# Patient Record
Sex: MALE | Race: OTHER | HISPANIC OR LATINO
[De-identification: names, ages, dates, MRNs, and addresses within clinical notes are randomized per-mention and may not be internally consistent; named-entity substitution may affect disease eponyms.]

---

## 2020-08-24 ENCOUNTER — LABORATORY RESULT (OUTPATIENT)
Age: 65
End: 2020-08-24

## 2020-08-24 ENCOUNTER — APPOINTMENT (OUTPATIENT)
Dept: GASTROENTEROLOGY | Facility: CLINIC | Age: 65
End: 2020-08-24
Payer: MEDICARE

## 2020-08-24 VITALS
WEIGHT: 132 LBS | BODY MASS INDEX: 21.99 KG/M2 | HEART RATE: 83 BPM | TEMPERATURE: 97.9 F | HEIGHT: 65 IN | SYSTOLIC BLOOD PRESSURE: 123 MMHG | DIASTOLIC BLOOD PRESSURE: 75 MMHG

## 2020-08-24 DIAGNOSIS — E11.9 TYPE 2 DIABETES MELLITUS W/OUT COMPLICATIONS: ICD-10-CM

## 2020-08-24 DIAGNOSIS — R07.89 OTHER CHEST PAIN: ICD-10-CM

## 2020-08-24 DIAGNOSIS — Z83.3 FAMILY HISTORY OF DIABETES MELLITUS: ICD-10-CM

## 2020-08-24 DIAGNOSIS — Z83.79 FAMILY HISTORY OF OTHER DISEASES OF THE DIGESTIVE SYSTEM: ICD-10-CM

## 2020-08-24 PROCEDURE — 99204 OFFICE O/P NEW MOD 45 MIN: CPT

## 2020-08-24 RX ORDER — SIMVASTATIN 20 MG/1
20 TABLET, FILM COATED ORAL
Refills: 0 | Status: ACTIVE | COMMUNITY

## 2020-08-24 RX ORDER — SITAGLIPTIN AND METFORMIN HYDROCHLORIDE 50; 1000 MG/1; MG/1
50-1000 TABLET, FILM COATED ORAL
Refills: 0 | Status: ACTIVE | COMMUNITY

## 2020-08-24 RX ORDER — LOSARTAN POTASSIUM 25 MG/1
25 TABLET, FILM COATED ORAL
Refills: 0 | Status: ACTIVE | COMMUNITY

## 2020-08-24 RX ORDER — PANTOPRAZOLE 40 MG/1
40 TABLET, DELAYED RELEASE ORAL
Refills: 0 | Status: ACTIVE | COMMUNITY

## 2020-08-24 NOTE — HISTORY OF PRESENT ILLNESS
[None] : had no significant interval events [Nausea] : denies nausea [Vomiting] : denies vomiting [Constipation] : denies constipation [Diarrhea] : denies diarrhea [Rectal Pain] : denies rectal pain [Yellow Skin Or Eyes (Jaundice)] : denies jaundice [Wt Loss ___ Lbs] : recent [unfilled] ~Upound(s) weight loss [Heartburn] : heartburn [Abdominal Pain] : abdominal pain [Abdominal Swelling] : abdominal swelling [GERD] : gastroesophageal reflux disease [Peptic Ulcer Disease] : peptic ulcer disease [Cholelithiasis] : cholelithiasis [Cholecystectomy] : cholecystectomy [Wt Gain ___ Lbs] : no recent weight gain [Pancreatitis] : no pancreatitis [Kidney Stone] : no kidney stone [Hiatus Hernia] : no hiatus hernia [Diverticulitis] : no diverticulitis [Inflammatory Bowel Disease] : no inflammatory bowel disease [Irritable Bowel Syndrome] : no irritable bowel syndrome [Alcohol Abuse] : no alcohol abuse [Malignancy] : no malignancy [Abdominal Surgery] : no abdominal surgery [Appendectomy] : no appendectomy [de-identified] : The patient is a 65-year-old  male with past medical history significant for hypertension, hypercholesterolemia and diabetes mellitus who was referred to my office by Dr. Shayla Thorne for abdominal pain, dyspepsia and gastroesophageal reflux disease. The patient also admits to having atypical chest pain. I was asked to render an opinion for consultation for the above complaints.   The patient states that he is feeling uncomfortable x several months.  The patient complains of abdominal pain.  The patient describes the abdominal pain as a crampy, intermittent upper abdominal discomfort that is nonradiating in nature.  The abdominal pain is worse with meals and improves with passing gas or having a bowel movement.  The abdominal pain is described as being mild to moderate in nature.  The abdominal pain occurs at night.  The abdominal pain can occur at any time.   The abdominal pain never has awakened the patient from sleep.  The abdominal pain is not relieved with any medications.  The abdominal pain is associated with abdominal gas and bloating.  The patient denies any nausea or vomiting.  The patient complains of gastroesophageal reflux disease but denies any dysphagia. The gastroesophageal reflux disease is worse after meals and late at night and in the early morning. The gastroesophageal reflux disease is slightly improved with proton pump inhibitors, H2 blockers and antacids.  The patient complains of atypical chest pain but denies any atypical chest pain, shortness of breath or palpitations.  The patient denies any diaphoresis. The patient denies any constipation or diarrhea.  The patient has 2 to 3 bowel movements a day.  The patient denies a change in bowel habits.  The patient denies a change in caliber of stool.  The patient denies having mucus discharge with the bowel movements.  The patient denies any bright red blood per rectum, melena or hematemesis.  The patient complains of rectal discomfort and rectal pruritus. The patient complains of weight loss but denies any anorexia.  The patient admits to losing 15 pounds over the past 3 years, unintentionally. He does not attribute the weight loss to exercise or change in diet.   He denies any fevers or chills.  The patient complains of occasional pruritus but denies any jaundice.  The patient complains of occasional lower back pain.  The blood work performed by his PMD on Kathy 10, 2020 revealed an elevated Hgb A1c of 6.7% and normal liver enzymes.  The patient had a prior history of cholecystectomy secondary to biliary colic.The patient admits to having a prior upper endoscopy and colonoscopy performed by another gastroenterologist, Dr. Syed Horne.  According to the patient, the upper endoscopic findings were unknown to the patient.  The colonoscopic findings revealed mild diverticulosis.   The patient admits to a family history of GI problems.  The patient’s mother had a history of liver cirrhosis with esophageal varices at age 51. [de-identified] : The blood work performed by his PMD on Kathy 10, 2020 revealed an elevated Hgb A1c of 6.7% and normal liver enzymes.\par (-) smoking, (-) ETOH, (-) IVDA\par

## 2020-08-24 NOTE — REVIEW OF SYSTEMS
[Feeling Tired] : feeling tired [Eyesight Problems] : eyesight problems [Chest Pain] : chest pain [Loss Of Hearing] : hearing loss [Heartburn] : heartburn [Abdominal Pain] : abdominal pain [Hesitancy] : urinary hesitancy [Easy Bruising] : a tendency for easy bruising [Nocturia] : nocturia [Itching] : itching [Negative] : Heme/Lymph [FreeTextEntry9] : myalgias [FreeTextEntry4] : echo in left ear

## 2020-08-24 NOTE — ASSESSMENT
[FreeTextEntry1] : Abdominal Pain: The patient complains of abdominal pain. The patient is to avoid nonsteroidal anti-inflammatory drugs and aspirin. I recommend a trial of Pantoprazole 40 mg once a day for 3 months for the symptoms.  \par Dyspepsia: The patient complains of dyspeptic symptoms.  The patient was advised to abide by an anti-gas diet.  The patient was given a pamphlet for anti-gas.  The patient and I reviewed the anti-gas diet at length. The patient is to start on a trial of Phazyme one tablet 3 times a day p.r.n. abdominal pain and gas.\par GERD: The patient was advised to avoid late-night meals and dietary indiscretions.  The patient was advised to avoid fried and fatty foods.  The patient was advised to abide by an anti-GERD diet. The patient was given a pamphlet for anti-GERD.  The patient and I reviewed the anti-GERD diet at length. I recommend a trial of Pantoprazole 40 mg once a day x 3 months for the symptoms.\par Atypical Chest Pain: The patient complains of atypical chest pain of unclear etiology.  The patient was advised to follow up with the PMD and cardiologist regarding evaluation for the atypical chest pain. The patient was told of possible etiologies such as cardiac, pulmonary, GI, musculoskeletal, stress and other causes for the atypical chest pain.  The patient agrees and will follow-up with the PMD and cardiologist. \par Weight Loss: The patient complains of weight loss but denies any anorexia.  The patient admits to losing 15 pounds over the past 3 years, unintentionally. He does not attribute the weight loss to exercise or change in diet.\par Upper Endoscopy: I recommend an upper endoscopy  to assess the symptoms for peptic ulcer disease versus esophagitis.  The patient was told of the risks and benefits of the procedure.  The patient was told of the risks of perforation, emergency surgery, bleeding, infections and missed lesions. The patient agreed and will schedule for procedure. The patient is to be n.p.o. after midnight.  The patient is to return for the procedure. \par Prior Endoscopic Procedures: The patient had a prior upper endoscopy and colonoscopy performed by another gastroenterologist, Terrell Horne.  I will try to obtain the prior upper endoscopy and colonoscopy reports.  The patient is to sign a record release to obtain those records.\par History of Peptic Ulcer Disease: The patient has a prior history of peptic ulcer disease. The patient is to avoid nonsteroidal anti-inflammatory drugs and aspirin. The patient`s symptoms have improved while taking Pantoprazole 40 mg once a day. I recommend continuing on a trial of Pantoprazole 40 mg once a day for 3 to 6 months for the symptoms. I recommend a repeat upper endoscopy to reassess for ulcer healing.  The patient was told of the risks and benefits of the procedure.  The patient was told of the risks of perforation, emergency surgery, bleeding, infections and missed lesions. The patient agreed and will schedule for procedure.  The patient is to be n.p.o. after midnight.  The patient is to return for the procedure. \par Blood Work: I recommend blood work to assess the patient's symptoms. I recommend a CBC, SMA 24, amylase, lipase, ESR, TFTs, SANDI, rheumatoid factor, celiac sprue panel, IgA, profile for hepatitis A, B, C. ,iron, TIBC, ferritin level and lipid profile.  I also recommend obtaining the recent blood work performed by the patient's PMD.\par Imaging Study: I recommend an imaging study to assess the symptoms. I recommend a CAT scan of the abdomen and pelvis with and without IV contrast to assess the pancreas for pancreatic cystic lesions.\par Diverticulitis: The patient has symptoms suggestive of acute diverticulitis. The patient may require an emergency room evaluation for diverticulitis if the symptoms persist.  The patient may require treatment with antibiotics if the symptoms persist. The patient may require treatment with a trial of Metronidazole 500 mg 3 times a day and Ciprofloxin 500mg twice a day for 2 weeks for the presumed acute diverticulitis.     I recommend a low residue diet.  I recommend a CAT scan of the abdomen and pelvis with IV contrast to assess the acute diverticulitis and possible complications. The patient was advised to go to the hospital if fever, chills, worsening abdominal pain or GI bleeding recurs.  The patient agrees.\par Follow-up: The patient is to follow-up in the office in 4 weeks to reassess the symptoms. The patient was told to call the office if any further problems. \par \par \par

## 2020-08-25 LAB
ALBUMIN SERPL ELPH-MCNC: 5 G/DL
ALP BLD-CCNC: 57 U/L
ALT SERPL-CCNC: 23 U/L
AMYLASE/CREAT SERPL: 104 U/L
ANA SER IF-ACNC: NEGATIVE
ANION GAP SERPL CALC-SCNC: 14 MMOL/L
AST SERPL-CCNC: 23 U/L
BASOPHILS # BLD AUTO: 0.07 K/UL
BASOPHILS NFR BLD AUTO: 0.9 %
BILIRUB SERPL-MCNC: 0.4 MG/DL
BUN SERPL-MCNC: 16 MG/DL
CALCIUM SERPL-MCNC: 10.2 MG/DL
CANCER AG19-9 SERPL-ACNC: 14 U/ML
CEA SERPL-MCNC: 1.5 NG/ML
CHLORIDE SERPL-SCNC: 101 MMOL/L
CO2 SERPL-SCNC: 25 MMOL/L
CREAT SERPL-MCNC: 0.95 MG/DL
EOSINOPHIL # BLD AUTO: 0.19 K/UL
EOSINOPHIL NFR BLD AUTO: 2.5 %
ERYTHROCYTE [SEDIMENTATION RATE] IN BLOOD BY WESTERGREN METHOD: 4 MM/HR
FERRITIN SERPL-MCNC: 50 NG/ML
GGT SERPL-CCNC: 20 U/L
GLIADIN IGA SER QL: <5 UNITS
GLIADIN IGG SER QL: <5 UNITS
GLIADIN PEPTIDE IGA SER-ACNC: NEGATIVE
GLIADIN PEPTIDE IGG SER-ACNC: NEGATIVE
GLUCOSE SERPL-MCNC: 107 MG/DL
HBV CORE IGG+IGM SER QL: NONREACTIVE
HBV CORE IGM SER QL: NONREACTIVE
HBV SURFACE AB SER QL: NONREACTIVE
HBV SURFACE AG SER QL: NONREACTIVE
HCT VFR BLD CALC: 49.6 %
HCV AB SER QL: NONREACTIVE
HCV S/CO RATIO: 0.12 S/CO
HEMOCCULT STL QL: NEGATIVE
HEPATITIS A IGG ANTIBODY: REACTIVE
HGB BLD-MCNC: 15.3 G/DL
IGA SER QL IEP: 241 MG/DL
IMM GRANULOCYTES NFR BLD AUTO: 0.1 %
IRON SATN MFR SERPL: 26 %
IRON SERPL-MCNC: 89 UG/DL
LPL SERPL-CCNC: 64 U/L
LYMPHOCYTES # BLD AUTO: 2.18 K/UL
LYMPHOCYTES NFR BLD AUTO: 28.3 %
MAN DIFF?: NORMAL
MCHC RBC-ENTMCNC: 28.8 PG
MCHC RBC-ENTMCNC: 30.8 GM/DL
MCV RBC AUTO: 93.2 FL
MONOCYTES # BLD AUTO: 0.63 K/UL
MONOCYTES NFR BLD AUTO: 8.2 %
NEUTROPHILS # BLD AUTO: 4.63 K/UL
NEUTROPHILS NFR BLD AUTO: 60 %
PLATELET # BLD AUTO: 268 K/UL
POTASSIUM SERPL-SCNC: 5.2 MMOL/L
PROT SERPL-MCNC: 7.5 G/DL
RBC # BLD: 5.32 M/UL
RBC # FLD: 15.1 %
RHEUMATOID FACT SER QL: <10 IU/ML
SODIUM SERPL-SCNC: 140 MMOL/L
TIBC SERPL-MCNC: 343 UG/DL
TSH SERPL-ACNC: 1.03 UIU/ML
TTG IGA SER IA-ACNC: <1.2 U/ML
TTG IGA SER-ACNC: NEGATIVE
TTG IGG SER IA-ACNC: 1.4 U/ML
TTG IGG SER IA-ACNC: NEGATIVE
UIBC SERPL-MCNC: 255 UG/DL
WBC # FLD AUTO: 7.71 K/UL

## 2020-08-26 LAB
HBV E AB SER QL: NEGATIVE
HBV E AG SER QL: NEGATIVE

## 2020-09-14 ENCOUNTER — APPOINTMENT (OUTPATIENT)
Dept: DISASTER EMERGENCY | Facility: CLINIC | Age: 65
End: 2020-09-14

## 2020-09-15 LAB — SARS-COV-2 N GENE NPH QL NAA+PROBE: NOT DETECTED

## 2020-09-17 ENCOUNTER — TRANSCRIPTION ENCOUNTER (OUTPATIENT)
Age: 65
End: 2020-09-17

## 2020-09-17 ENCOUNTER — OUTPATIENT (OUTPATIENT)
Dept: OUTPATIENT SERVICES | Facility: HOSPITAL | Age: 65
LOS: 1 days | End: 2020-09-17
Payer: MEDICARE

## 2020-09-17 ENCOUNTER — RESULT REVIEW (OUTPATIENT)
Age: 65
End: 2020-09-17

## 2020-09-17 ENCOUNTER — APPOINTMENT (OUTPATIENT)
Dept: GASTROENTEROLOGY | Facility: HOSPITAL | Age: 65
End: 2020-09-17

## 2020-09-17 DIAGNOSIS — R10.12 LEFT UPPER QUADRANT PAIN: ICD-10-CM

## 2020-09-17 DIAGNOSIS — R10.11 RIGHT UPPER QUADRANT PAIN: ICD-10-CM

## 2020-09-17 PROCEDURE — 88305 TISSUE EXAM BY PATHOLOGIST: CPT | Mod: 26

## 2020-09-17 PROCEDURE — 88312 SPECIAL STAINS GROUP 1: CPT

## 2020-09-17 PROCEDURE — 88312 SPECIAL STAINS GROUP 1: CPT | Mod: 26

## 2020-09-17 PROCEDURE — 43239 EGD BIOPSY SINGLE/MULTIPLE: CPT

## 2020-09-17 PROCEDURE — 88305 TISSUE EXAM BY PATHOLOGIST: CPT

## 2020-09-17 PROCEDURE — 82962 GLUCOSE BLOOD TEST: CPT

## 2020-09-17 NOTE — CHART NOTE - NSCHARTNOTEFT_GEN_A_CORE
Esophagogastroduodenoscopy Report:    Indication:             Abdominal pain, dyspepsia, GERD, atypical chest pain, weight loss, Hx. of peptic ulcer disease  Referring MD:       Dr. Shayla Thorne  Instrument:  #        [id number]  Anesthesia:            MAC    Consent:  Informed consent was obtained from the patient after providing any opportunity for questions  Procedure: The gastroscope was gently passed through the incisoral orifice into the oral cavity and under direct visualization the esophagus was intubated. The endoscope was passed down the esophagus, through the stomach and into proximal jejunum. Color, texture, mucosa and anatomy of the esophagus, stomach, and duodenum were carefully examined with the scope. The patient tolerated the procedure well. After completion of the examination, the patient was transferred to the recovery room.     Procedure: Upper endoscopy and biopsies    Preparation: NPO     Findings:     Oropharynx:	   Normal appearing oropharynx.  Esophagus:	   Normal appearing esophagus with no ulcers, erosions, erythema noted.  Biopsy taken.  EG-junction:	   Normal appearing E-G junction at 35 cm. (2 cm) hiatal hernia noted. No ulcers, erosions or erythema noted.  Cardia:	                 Mild diffuse erythema suggestive of gastritis but no ulcers or erosions noted.  (+) Bile suctioned.  Body:	                 Normal appearing gastric mucosa with no ulcers, erosions or erythema noted.  Biopsy taken.  Antrum:	                 Normal appearing gastric mucosa with no ulcers, erosions or erythema noted.  Biopsy taken.  Pylorus:	                 Normal appearing pylorus and pyloric channel with no ulcers, erosions or erythema noted.     Duodenal Bulb:         Normal appearing duodenal mucosa with no ulcers, erosions or erythema noted. Biopsy taken.  2nd portion:	    Normal appearing duodenal mucosa with no ulcers, erosions or erythema noted.  Biopsy taken.  3rd portion:	    Not visualized.      EBL:0    Impression: 1. Hiatal hernia 2. Mild diffuse bile gastritis    Plan:    1. Avoid late-night meals and dietary indiscretions.  2. Avoid fried and fatty foods.  3. Avoid nonsteroidal anti-inflammatory drugs and aspirin.  4. Will check path results.  5. Recommend a trial of pantoprazole 40 mg once a day for 3 months.  6. Followup in office in 4 weeks to reassess the symptoms and discuss the findings.      Procedure Start Time:  9:36 am  Procedure End Time:    9:43 am      Attending:       Telly Mcgrath M.D.

## 2020-09-21 LAB — SURGICAL PATHOLOGY STUDY: SIGNIFICANT CHANGE UP

## 2020-11-02 ENCOUNTER — APPOINTMENT (OUTPATIENT)
Dept: GASTROENTEROLOGY | Facility: CLINIC | Age: 65
End: 2020-11-02
Payer: MEDICARE

## 2020-11-02 VITALS
WEIGHT: 135 LBS | OXYGEN SATURATION: 98 % | HEART RATE: 90 BPM | SYSTOLIC BLOOD PRESSURE: 123 MMHG | DIASTOLIC BLOOD PRESSURE: 79 MMHG | TEMPERATURE: 97.2 F | BODY MASS INDEX: 22.49 KG/M2 | HEIGHT: 65 IN

## 2020-11-02 DIAGNOSIS — R63.4 ABNORMAL WEIGHT LOSS: ICD-10-CM

## 2020-11-02 DIAGNOSIS — K57.32 DIVERTICULITIS OF LARGE INTESTINE W/OUT PERFORATION OR ABSCESS W/OUT BLEEDING: ICD-10-CM

## 2020-11-02 DIAGNOSIS — R10.11 RIGHT UPPER QUADRANT PAIN: ICD-10-CM

## 2020-11-02 DIAGNOSIS — R13.10 DYSPHAGIA, UNSPECIFIED: ICD-10-CM

## 2020-11-02 DIAGNOSIS — R10.12 RIGHT UPPER QUADRANT PAIN: ICD-10-CM

## 2020-11-02 PROCEDURE — 99072 ADDL SUPL MATRL&STAF TM PHE: CPT

## 2020-11-02 PROCEDURE — 99214 OFFICE O/P EST MOD 30 MIN: CPT

## 2020-11-02 NOTE — HISTORY OF PRESENT ILLNESS
[None] : had no significant interval events [Nausea] : denies nausea [Vomiting] : denies vomiting [Diarrhea] : denies diarrhea [Yellow Skin Or Eyes (Jaundice)] : denies jaundice [Heartburn] : heartburn [Constipation] : constipation [Abdominal Pain] : abdominal pain [Abdominal Swelling] : abdominal swelling [Rectal Pain] : rectal pain [GERD] : gastroesophageal reflux disease [Hiatus Hernia] : hiatus hernia [Cholelithiasis] : cholelithiasis [Cholecystectomy] : cholecystectomy [Wt Gain ___ Lbs] : no recent weight gain [Wt Loss ___ Lbs] : no recent weight loss [Peptic Ulcer Disease] : no peptic ulcer disease [Pancreatitis] : no pancreatitis [Kidney Stone] : no kidney stone [Inflammatory Bowel Disease] : no inflammatory bowel disease [Irritable Bowel Syndrome] : no irritable bowel syndrome [Diverticulitis] : no diverticulitis [Alcohol Abuse] : no alcohol abuse [Malignancy] : no malignancy [Abdominal Surgery] : no abdominal surgery [Appendectomy] : no appendectomy [de-identified] : The patient states that he is feeling better. The patient denies any jaundice or pruritus.  The patient complains of chronic lower back pain. The patient complains of abdominal pain.  The patient describes the abdominal pain as a crampy, intermittent diffuse abdominal discomfort that is nonradiating in nature.  The abdominal pain is worse with meals and improves with passing gas or having a bowel movement.  The abdominal pain is described as being mild to moderate in nature.  The abdominal pain occurs at night and in the morning.  The abdominal pain can occur at any time.   The abdominal pain has never awakened the patient from sleep.  The abdominal pain is slightly relieved with certain medication such as proton pump inhibitors, H2 blockers and antacids.  The abdominal pain is associated with abdominal gas and bloating.  The patient denies any nausea or vomiting.  The patient complains of occasional gastroesophageal reflux disease and dysphagia.  The dysphagia is worse with solids but unrelated to liquids. The gastroesophageal reflux disease is worse after meals and late at night and in the early morning. The gastroesophageal reflux disease is improved with proton pump inhibitors, H2 blockers and antacids.   The patient denies any atypical chest pain, shortness of breath or palpitations.  The patient denies any diaphoresis. The patient complains of constipation but denies any constipation or diarrhea.  The patient has 1 to 2 bowel movements a day.  The patient complains of a change in bowel habits.  The patient complains of a change in caliber of stool.   .  The patient denies having mucus discharge with the bowel movements.  The patient denies any bright red blood per rectum, melena or hematemesis.  The patient complains of rectal pain and rectal pruritus.  The patient denies any weight loss or anorexia.  He denies any fevers or chills.  The patient had an upper endoscopy at the [facility] endoscopy suite on September 17, 2020. The upper endoscopy was performed up to the level of the second portion of the duodenum. The upper endoscopy revealed a hiatal hernia and mild diffuse bile gastritis. Biopsies were taken of the distal esophagus, antrum, body of stomach and duodenum to assess for esophagitis, gastritis and duodenitis. The pathology revealed distal esophagus with squamous mucosa with reactive changes that was negative for metaplasia or eosinophilia with no evidence of fungal infection, gastric antral and body mucosa with benign antral and oxyntic mucosa with patchy, nonspecific inflammation with mild foveolar hyperplasia, mild mucosal fibrosis and fundic gland polyps that was negative for Helicobacter pylori and benign small intestinal mucosa with intact villous architecture with no histologic evidence of celiac disease.  The patient tolerated the procedure well.  The blood work performed on August 24, 2020 revealed an elevated glucose of 107 mg/dl, an elevated lipase of 64 U/L, a reactive hepatitis A IgG antibody, a normal CEA level of 1.5 ng/ml and normal CA 19-9 of 14 U/ml.  The blood work performed by his PMD on Kathy 10, 2020 revealed an elevated Hgb A1c of 6.7% and normal liver enzymes. The patient had a prior history of cholecystectomy secondary to biliary colic.  The patient admits to having a prior upper endoscopy and colonoscopy performed by another gastroenterologist, Dr. Syed Horne. According to the patient, the upper endoscopic findings were unknown to the patient. The colonoscopic findings revealed mild diverticulosis. The patient admits to a family history of GI problems. The patient’s mother had a history of liver cirrhosis with esophageal varices at age 51.  [de-identified] : (-) smoking, (-) ETOH, (-) IVDA\par

## 2020-11-02 NOTE — ASSESSMENT
[FreeTextEntry1] : Abdominal Pain: The patient complains of abdominal pain. The patient is to avoid nonsteroidal anti-inflammatory drugs and aspirin. I recommend a trial of Pantoprazole 40 mg once a day for 3 months for the symptoms.  \par Dyspepsia: The patient complains of dyspeptic symptoms.  The patient was advised to abide by an anti-gas (low FOD MAP) diet.  The patient was given a pamphlet for anti-gas.  The patient and I reviewed the anti-gas diet at length. The patient is to start on a trial of Phazyme one tablet 3 times a day p.r.n. abdominal pain and gas.\par GERD: The patient was advised to avoid late-night meals and dietary indiscretions.  The patient was advised to avoid fried and fatty foods.  The patient was advised to abide by an anti-GERD diet. The patient was given a pamphlet for anti-GERD.  The patient and I reviewed the anti-GERD diet at length. I recommend a trial of Pantoprazole 40 mg once a day x 3 months for the symptoms.\par Dysphagia: The patient complains of dysphagia of unclear etiology.   The dysphagia is worse with meals but not with liquids.   If the symptoms persist despite medications and since the upper endoscopy is unremarkable, the patient may require motility studies to assess the etiology of the dysphagia.  The patient agrees and will follow-up for further work-up and treatment.\par Constipation: The patient complains of constipation. I recommend a high-fiber diet. I recommend a trial of a probiotic such as Align once a day. I recommend a trial of Metamucil once a day for fiber supplementation. I recommend a trial of Miralax 1 packet once a day for the constipation. If the symptoms persist, the patient may require a colonoscopy to assess the symptoms.  The patient was told of the risks and benefits of the procedure.  The patient was told of the risks of perforation, emergency surgery, bleeding, infections and missed lesions.  The patient agreed and will followup to reassess the symptoms.  \par Weight Loss: The patient complained of weight loss but denies any anorexia. The patient admitted to losing 15 pounds over 3 years, unintentionally. He does not attribute the weight loss to exercise or change in diet.\par Imaging Study: I recommend an imaging study to assess the symptoms. I recommend a CAT scan of the abdomen and pelvis with and without IV contrast to assess the pancreas for pancreatic cystic lesions.\par Diverticulitis: The patient has symptoms suggestive of acute diverticulitis. The patient may require an emergency room evaluation for diverticulitis if the symptoms persist. The patient may require treatment with antibiotics if the symptoms persist. The patient may require treatment with a trial of Metronidazole 500 mg 3 times a day and Ciprofloxin 500mg twice a day for 2 weeks for the presumed acute diverticulitis. I recommend a low residue diet. I recommend a CAT scan of the abdomen and pelvis with IV contrast to assess the acute diverticulitis and possible complications. The patient was advised to go to the hospital if fever, chills, worsening abdominal pain or GI bleeding recurs. The patient agrees.\par Blood Work: I recommend blood work to assess the patient's symptoms. I recommend a CBC, SMA 24, amylase, lipase, ESR.  \par Prior Endoscopic Procedures: The patient had a prior upper endoscopy and colonoscopy performed by another gastroenterologist, Terrell Horne. I will try to obtain the prior upper endoscopy and colonoscopy reports. The patient is to sign a record release to obtain those records.\par History of Peptic Ulcer Disease: The patient has a prior history of peptic ulcer disease. The patient is to avoid nonsteroidal anti-inflammatory drugs and aspirin. There were no ulcers on present upper endoscopy The patient agreed and will follow-up to reassess the symptoms.\par Hiatal Hernia:  The patient was advised to avoid late-night meals and dietary indiscretions.  The patient was advised to avoid fried and fatty foods.  The patient was advised to abide by an anti-GERD diet. The patient was given a pamphlet for anti-GERD.  The patient and I reviewed the anti-GERD diet at length. I recommend a trial of Pantoprazole 40 mg once a day for 3 months for the symptoms.\par If the symptoms persist, the patient may require a CAT scan of the abdomen and pelvis with IV contrast to assess the symptoms. The patient agrees.\par Follow-up: The patient is to follow-up in the office in 1 month to reassess the symptoms. The patient was told to call the office if any further problems. \par \par \par \par \par

## 2020-11-06 ENCOUNTER — TRANSCRIPTION ENCOUNTER (OUTPATIENT)
Age: 65
End: 2020-11-06

## 2020-11-10 LAB
ALBUMIN SERPL ELPH-MCNC: 5 G/DL
ALP BLD-CCNC: 55 U/L
ALT SERPL-CCNC: 23 U/L
AMYLASE/CREAT SERPL: 112 U/L
ANION GAP SERPL CALC-SCNC: 12 MMOL/L
AST SERPL-CCNC: 20 U/L
BASOPHILS # BLD AUTO: 0.05 K/UL
BASOPHILS NFR BLD AUTO: 0.6 %
BILIRUB SERPL-MCNC: 0.4 MG/DL
BUN SERPL-MCNC: 19 MG/DL
CALCIUM SERPL-MCNC: 11 MG/DL
CHLORIDE SERPL-SCNC: 102 MMOL/L
CO2 SERPL-SCNC: 29 MMOL/L
CREAT SERPL-MCNC: 0.95 MG/DL
EOSINOPHIL # BLD AUTO: 0.19 K/UL
EOSINOPHIL NFR BLD AUTO: 2.1 %
GGT SERPL-CCNC: 21 U/L
GLUCOSE SERPL-MCNC: 103 MG/DL
HCT VFR BLD CALC: 48.5 %
HGB BLD-MCNC: 15.8 G/DL
IMM GRANULOCYTES NFR BLD AUTO: 0.2 %
LPL SERPL-CCNC: 64 U/L
LYMPHOCYTES # BLD AUTO: 2.5 K/UL
LYMPHOCYTES NFR BLD AUTO: 27.5 %
MAN DIFF?: NORMAL
MCHC RBC-ENTMCNC: 29.4 PG
MCHC RBC-ENTMCNC: 32.6 GM/DL
MCV RBC AUTO: 90.3 FL
MONOCYTES # BLD AUTO: 0.69 K/UL
MONOCYTES NFR BLD AUTO: 7.6 %
NEUTROPHILS # BLD AUTO: 5.64 K/UL
NEUTROPHILS NFR BLD AUTO: 62 %
PLATELET # BLD AUTO: 297 K/UL
POTASSIUM SERPL-SCNC: 5.6 MMOL/L
PROT SERPL-MCNC: 7.7 G/DL
RBC # BLD: 5.37 M/UL
RBC # FLD: 14.5 %
SODIUM SERPL-SCNC: 143 MMOL/L
WBC # FLD AUTO: 9.09 K/UL

## 2020-11-16 ENCOUNTER — OUTPATIENT (OUTPATIENT)
Dept: OUTPATIENT SERVICES | Facility: HOSPITAL | Age: 65
LOS: 1 days | End: 2020-11-16
Payer: MEDICARE

## 2020-11-16 ENCOUNTER — APPOINTMENT (OUTPATIENT)
Dept: CT IMAGING | Facility: HOSPITAL | Age: 65
End: 2020-11-16
Payer: MEDICARE

## 2020-11-16 DIAGNOSIS — K57.32 DIVERTICULITIS OF LARGE INTESTINE WITHOUT PERFORATION OR ABSCESS WITHOUT BLEEDING: ICD-10-CM

## 2020-11-16 PROCEDURE — 74177 CT ABD & PELVIS W/CONTRAST: CPT

## 2020-11-16 PROCEDURE — 74177 CT ABD & PELVIS W/CONTRAST: CPT | Mod: 26

## 2020-12-11 ENCOUNTER — APPOINTMENT (OUTPATIENT)
Dept: GASTROENTEROLOGY | Facility: CLINIC | Age: 65
End: 2020-12-11
Payer: MEDICARE

## 2020-12-11 VITALS
WEIGHT: 130 LBS | BODY MASS INDEX: 21.66 KG/M2 | HEIGHT: 65 IN | TEMPERATURE: 97.3 F | DIASTOLIC BLOOD PRESSURE: 89 MMHG | SYSTOLIC BLOOD PRESSURE: 134 MMHG | HEART RATE: 87 BPM | OXYGEN SATURATION: 100 %

## 2020-12-11 PROCEDURE — 99213 OFFICE O/P EST LOW 20 MIN: CPT

## 2020-12-11 PROCEDURE — 99072 ADDL SUPL MATRL&STAF TM PHE: CPT

## 2020-12-11 NOTE — HISTORY OF PRESENT ILLNESS
[None] : had no significant interval events [Nausea] : denies nausea [Vomiting] : denies vomiting [Diarrhea] : denies diarrhea [Yellow Skin Or Eyes (Jaundice)] : denies jaundice [Abdominal Pain] : denies abdominal pain [Rectal Pain] : denies rectal pain [Wt Loss ___ Lbs] : recent [unfilled] ~Upound(s) weight loss [Heartburn] : heartburn [Constipation] : constipation [Abdominal Swelling] : abdominal swelling [GERD] : gastroesophageal reflux disease [Hiatus Hernia] : hiatus hernia [Cholelithiasis] : cholelithiasis [Cholecystectomy] : cholecystectomy [Wt Gain ___ Lbs] : no recent weight gain [Peptic Ulcer Disease] : no peptic ulcer disease [Pancreatitis] : no pancreatitis [Kidney Stone] : no kidney stone [Inflammatory Bowel Disease] : no inflammatory bowel disease [Irritable Bowel Syndrome] : no irritable bowel syndrome [Diverticulitis] : no diverticulitis [Alcohol Abuse] : no alcohol abuse [Malignancy] : no malignancy [Abdominal Surgery] : no abdominal surgery [Appendectomy] : no appendectomy [de-identified] : The patient states that he is feeling fine.   The patient denies any exposure to COVID 19 infection.  The patient denies any recent viral illness.  The patient denies any jaundice or pruritus.  The patient denies any chronic lower back pain. The patient denies any abdominal pain.  The patient complains of abdominal gas and bloating.  The patient denies any nausea or vomiting.  The patient complains of gastroesophageal reflux disease but denies any dysphagia.  The gastroesophageal reflux disease is worse after meals and late at night. The gastroesophageal reflux disease is improved with proton pump inhibitors, H2 blockers and antacids.   The patient denies any atypical chest pain, shortness of breath or palpitations.  The patient denies any diaphoresis. The patient complains of constipation but denies any diarrhea.  The patient has 1 to 2 bowel movements every 1 to 2 days.  The patient complains of a change in bowel habits.  The patient complains of a change in caliber of stool.  The patient denies having mucus discharge with the bowel movements.  The patient denies any bright red blood per rectum, melena or hematemesis.  The patient complains of rectal pain but denies any rectal pruritus.  The patient complains of weight loss but denies any anorexia.  The patient admits to losing 2 pounds over the past 1 month.  He denies any fevers or chills.  The patient had an upper endoscopy at the [facility] GI endoscopy suite on September 17, 2020. The upper endoscopy was performed up to the level of the second portion of the duodenum. The upper endoscopy revealed a hiatal hernia and mild diffuse bile gastritis. Biopsies were taken of the distal esophagus, antrum, body of stomach and duodenum to assess for esophagitis, gastritis and duodenitis. The pathology revealed distal esophagus with squamous mucosa with reactive changes that was negative for metaplasia or eosinophilia with no evidence of fungal infection, gastric antral and body mucosa with benign antral and oxyntic mucosa with patchy, nonspecific inflammation with mild foveolar hyperplasia, mild mucosal fibrosis and fundic gland polyps that was negative for Helicobacter pylori and benign small intestinal mucosa with intact villous architecture with no histologic evidence of celiac disease. The patient tolerated the procedure well.  The patient had a CAT scan of the abdomen and pelvis with IV contrast performed on November 16, 2020 to assess the symptoms. The CAT scan of the abdomen and pelvis with IV contrast performed on November 16, 2020 revealed a mildly prominent appendix measuring 7 mm with no wall thickening or periappendiceal edema. Also noted was constipated colon. The patient had blood work performed to assess the symptoms. The blood work performed on November 2, 2020 revealed an elevated potassium of 5.6 mmol/L, an elevated glucose of 102 mg/dl, an elevated calcium of 11.0 mg/dl and an elevated lipase of 64 U/L.  The blood work performed on August 24, 2020 revealed an elevated glucose of 107 mg/dl, an elevated lipase of 64 U/L, a reactive hepatitis A IgG antibody, a normal CEA level of 1.5 ng/ml and normal CA 19-9 of 14 U/ml. The blood work performed by his PMD on Kathy 10, 2020 revealed an elevated Hgb A1c of 6.7% and normal liver enzymes. The patient had a prior history of cholecystectomy secondary to biliary colic. The patient admits to having a prior upper endoscopy and colonoscopy performed by another gastroenterologist, Dr. Syed Horne. According to the patient, the upper endoscopic findings were unknown to the patient. The colonoscopic findings revealed mild diverticulosis. The patient admits to a family history of GI problems. The patient’s mother had a history of liver cirrhosis with esophageal varices at age 51.  [de-identified] : (-) smoking, (-) ETOH, (-) IVDA\par

## 2020-12-11 NOTE — ASSESSMENT
[FreeTextEntry1] : Dyspepsia: The patient complains of dyspeptic symptoms.  The patient was advised to abide by an anti-gas diet.  The patient was given a pamphlet for anti-gas.  The patient and I reviewed the anti-gas diet at length. The patient is to start on a trial of Phazyme one tablet 3 times a day p.r.n. abdominal pain and gas.\par GERD: The patient was advised to avoid late-night meals and dietary indiscretions.  The patient was advised to avoid fried and fatty foods.  The patient was advised to abide by an anti-GERD diet. The patient was given a pamphlet for anti-GERD.  The patient and I reviewed the anti-GERD diet at length. I recommend a trial of Pantoprazole 40 mg once a day x 3 months for the symptoms.\par Constipation: The patient complains of constipation. I recommend a high-fiber diet. I recommend a trial of a probiotic such as Align once a day. I recommend a trial of Metamucil once a day for fiber supplementation. I recommend a trial of Mirlax 1 packet once a day for the constipation. If the symptoms persist, the patient may require a colonoscopy to assess the symptoms.  The patient was told of the risks and benefits of the procedure.  The patient was told of the risks of perforation, emergency surgery, bleeding, infections and missed lesions.  The patient agreed and will followup to reassess the symptoms.  \par Prior Endoscopic Procedures: The patient had a prior upper endoscopy and colonoscopy performed by another gastroenterologist, Terrell Horne. I will try to obtain the prior upper endoscopy and colonoscopy reports. The patient is to sign a record release to obtain those records.\par History of Peptic Ulcer Disease: The patient has a prior history of peptic ulcer disease. The patient is to avoid nonsteroidal anti-inflammatory drugs and aspirin. There were no ulcers on present upper endoscopy. The patient agreed and will follow-up to reassess the symptoms.\par Hiatal Hernia: The patient was advised to avoid late-night meals and dietary indiscretions. The patient was advised to avoid fried and fatty foods. The patient was advised to abide by an anti-GERD diet. The patient was given a pamphlet for anti-GERD. The patient and I reviewed the anti-GERD diet at length. I recommend a trial of Pantoprazole 40 mg once a day for 3 months for the symptoms.\par Follow-up: The patient is to follow-up in the office in 6 month to reassess the symptoms. The patient was told to call the office if any further problems. \par \par

## 2020-12-14 ENCOUNTER — TRANSCRIPTION ENCOUNTER (OUTPATIENT)
Age: 65
End: 2020-12-14

## 2021-04-08 ENCOUNTER — RX RENEWAL (OUTPATIENT)
Age: 66
End: 2021-04-08

## 2021-07-28 ENCOUNTER — RX RENEWAL (OUTPATIENT)
Age: 66
End: 2021-07-28

## 2021-08-11 ENCOUNTER — APPOINTMENT (OUTPATIENT)
Dept: GASTROENTEROLOGY | Facility: CLINIC | Age: 66
End: 2021-08-11
Payer: MEDICARE

## 2021-08-11 VITALS
HEART RATE: 86 BPM | HEIGHT: 65 IN | WEIGHT: 135 LBS | OXYGEN SATURATION: 97 % | BODY MASS INDEX: 22.49 KG/M2 | SYSTOLIC BLOOD PRESSURE: 124 MMHG | DIASTOLIC BLOOD PRESSURE: 84 MMHG | TEMPERATURE: 97.7 F

## 2021-08-11 DIAGNOSIS — G89.29 LEFT LOWER QUADRANT PAIN: ICD-10-CM

## 2021-08-11 DIAGNOSIS — R10.32 LEFT LOWER QUADRANT PAIN: ICD-10-CM

## 2021-08-11 PROCEDURE — 99214 OFFICE O/P EST MOD 30 MIN: CPT

## 2021-08-11 RX ORDER — BETAMETHASONE DIPROPIONATE 0.5 MG/G
0.05 CREAM TOPICAL
Refills: 0 | Status: ACTIVE | COMMUNITY

## 2021-08-11 RX ORDER — KETOCONAZOLE 20 MG/G
2 CREAM TOPICAL
Refills: 0 | Status: ACTIVE | COMMUNITY

## 2021-08-11 RX ORDER — FAMOTIDINE 40 MG/1
40 TABLET, FILM COATED ORAL
Qty: 60 | Refills: 3 | Status: ACTIVE | COMMUNITY
Start: 2021-08-11 | End: 1900-01-01

## 2021-08-11 RX ORDER — DICYCLOMINE HYDROCHLORIDE 10 MG/1
10 CAPSULE ORAL 3 TIMES DAILY
Qty: 90 | Refills: 3 | Status: ACTIVE | COMMUNITY
Start: 2021-08-11 | End: 1900-01-01

## 2021-08-11 RX ORDER — TRIAMCINOLONE ACETONIDE 1 MG/G
0.1 CREAM TOPICAL
Refills: 0 | Status: ACTIVE | COMMUNITY

## 2021-08-11 NOTE — HISTORY OF PRESENT ILLNESS
[None] : had no significant interval events [Nausea] : denies nausea [Vomiting] : denies vomiting [Diarrhea] : denies diarrhea [Yellow Skin Or Eyes (Jaundice)] : denies jaundice [Rectal Pain] : denies rectal pain [Wt Gain ___ Lbs] : recent [unfilled] ~Upound(s) weight gain [Heartburn] : heartburn [Constipation] : constipation [Abdominal Pain] : abdominal pain [Abdominal Swelling] : abdominal swelling [GERD] : gastroesophageal reflux disease [Peptic Ulcer Disease] : peptic ulcer disease [Cholelithiasis] : cholelithiasis [Cholecystectomy] : cholecystectomy [Hiatus Hernia] : hiatus hernia [Pancreatitis] : no pancreatitis [Kidney Stone] : no kidney stone [Inflammatory Bowel Disease] : no inflammatory bowel disease [Irritable Bowel Syndrome] : no irritable bowel syndrome [Diverticulitis] : no diverticulitis [Alcohol Abuse] : no alcohol abuse [Malignancy] : no malignancy [Abdominal Surgery] : no abdominal surgery [Appendectomy] : no appendectomy [de-identified] : The patient denies any exposure to COVID 19 infection. The patient denies any recent viral illness. The patient states that he is feeling uncomfortable x several months. The patient denies any jaundice or pruritus.  The patient denies any complains of chronic lower back pain. The patient complains of abdominal pain.  The patient describes the abdominal pain as a crampy, intermittent left lower quadrant discomfort that is nonradiating in nature.  The abdominal pain is worse with meals and improves with passing gas or having a bowel movement.  The abdominal pain is described as mild to moderate in nature.  The abdominal pain occurs at night and in the morning.  The abdominal pain can occur at any time.   The abdominal pain has never awakened the patient from sleep.  The abdominal pain is relieved with certain medication such as gas X.  The abdominal pain is associated with abdominal gas and bloating.  The patient any nausea or vomiting.  The patient complains of gastroesophageal reflux disease and occasional dysphagia.  The dysphagia is worse with solids but unrelated to liquids. The gastroesophageal reflux disease is worse after meals and late at night and in the early morning. The gastroesophageal reflux disease is slightly improved with proton pump inhibitors, H2 blockers and antacids.   The patient denies any atypical chest pain, shortness of breath or palpitations.  The patient denies any diaphoresis. The patient complains of occasional constipation but denies any diarrhea.  The patient has 1 to 2 bowel movements a day.  The patient complains of a change in bowel habits.  The patient complains of a change in caliber of stool.  The patient denies having mucus discharge with the bowel movements.  The patient denies any bright red blood per rectum, melena or hematemesis.  The patient denies any rectal pain or rectal pruritus.  The patient denies any weight loss or anorexia.  The patient admits to gaining 3 pounds over the past 3 months.  He denies any fevers or chills. The patient had an upper endoscopy at the [facility] GI endoscopy suite on September 17, 2020. The upper endoscopy was performed up to the level of the second portion of the duodenum. The upper endoscopy revealed a hiatal hernia and mild diffuse bile gastritis. Biopsies were taken of the distal esophagus, antrum, body of stomach and duodenum to assess for esophagitis, gastritis and duodenitis. The pathology revealed distal esophagus with squamous mucosa with reactive changes that was negative for metaplasia or eosinophilia with no evidence of fungal infection, gastric antral and body mucosa with benign antral and oxyntic mucosa with patchy, nonspecific inflammation with mild foveolar hyperplasia, mild mucosal fibrosis and fundic gland polyps that was negative for Helicobacter pylori and benign small intestinal mucosa with intact villous architecture with no histologic evidence of celiac disease. The patient tolerated the procedure well. The CAT scan of the abdomen and pelvis with IV contrast performed on November 16, 2020 revealed a mildly prominent appendix measuring 7 mm with no wall thickening or periappendiceal edema. Also noted was constipated colon. The blood work performed on November 2, 2020 revealed an elevated potassium of 5.6 mmol/L, an elevated glucose of 102 mg/dl, an elevated calcium of 11.0 mg/dl and an elevated lipase of 64 U/L. The blood work performed on August 24, 2020 revealed an elevated glucose of 107 mg/dl, an elevated lipase of 64 U/L, a reactive hepatitis A IgG antibody, a normal CEA level of 1.5 ng/ml and normal CA 19-9 of 14 U/ml. The blood work performed by his PMD on Kathy 10, 2020 revealed an elevated Hgb A1c of 6.7% and normal liver enzymes. The patient had a prior history of cholecystectomy secondary to biliary colic. The patient admits to having a prior upper endoscopy and colonoscopy performed by another gastroenterologist, Dr. Syed Horne. According to the patient, the upper endoscopic findings were unknown to the patient. The colonoscopic findings revealed mild diverticulosis. The patient admits to a family history of GI problems. The patient’s mother had a history of liver cirrhosis with esophageal varices at age 51.  [de-identified] : (-) smoking, (-) ETOH, (-) IVDA\par

## 2021-08-11 NOTE — ASSESSMENT
[FreeTextEntry1] : Abdominal Pain: The patient complains of abdominal pain. The patient is to avoid nonsteroidal anti-inflammatory drugs and aspirin.  I recommend a low FOD-MAP diet.  I recommend a trial of Dicyclomine 10 mg tablet PO 3 times a day PRN for the abdominal pain.\par Dyspepsia: The patient complains of dyspeptic symptoms.  The patient was advised to abide by an anti-gas diet.  The patient was given a pamphlet for anti-gas.  The patient and I reviewed the anti-gas diet at length. The patient is to continue on a trial of Phazyme one tablet 3 times a day p.r.n. abdominal pain and gas.\par GERD: The patient was advised to avoid late-night meals and dietary indiscretions.  The patient was advised to avoid fried and fatty foods.  The patient was advised to abide by an anti-GERD diet. The patient was given a pamphlet for anti-GERD.  The patient and I reviewed the anti-GERD diet at length. I recommend a trial of Pepcid 40 mg twice a day x 3 months for the symptoms.\par Constipation: The patient complains of constipation. I recommend a high-fiber diet. I recommend a trial of a probiotic such as Align once a day. I recommend a trial of Metamucil once a day for fiber supplementation. I recommend a trial of Miralax 1 packet  once a day for the constipation. If the symptoms persist, the patient may require a colonoscopy to assess the symptoms.  The patient was told of the risks and benefits of the procedure.  The patient was told of the risks of perforation, emergency surgery, bleeding, infections and missed lesions.  The patient agreed and will followup to reassess the symptoms.  \par Prior Endoscopic Procedures: The patient had a prior upper endoscopy and colonoscopy performed by another gastroenterologist, Terrell Horne. I will try to obtain the prior upper endoscopy and colonoscopy reports. The patient is to sign a record release to obtain those records.\par History of Peptic Ulcer Disease: The patient has a prior history of peptic ulcer disease. The patient is to avoid nonsteroidal anti-inflammatory drugs and aspirin. There were no ulcers on present upper endoscopy. The patient agreed and will follow-up to reassess the symptoms.\par Hiatal Hernia: The patient was advised to avoid late-night meals and dietary indiscretions. The patient was advised to avoid fried and fatty foods. The patient was advised to abide by an anti-GERD diet. The patient was given a pamphlet for anti-GERD. The patient and I reviewed the anti-GERD diet at length. I recommend a trial of Pantoprazole 40 mg once a day for 3 months for the symptoms.\par Follow-up: The patient is to follow-up in the office in 3 month to reassess the symptoms. The patient was told to call the office if any further problems. \par \par \par \par

## 2021-08-27 ENCOUNTER — RX RENEWAL (OUTPATIENT)
Age: 66
End: 2021-08-27

## 2021-09-26 ENCOUNTER — RX RENEWAL (OUTPATIENT)
Age: 66
End: 2021-09-26

## 2021-12-25 ENCOUNTER — TRANSCRIPTION ENCOUNTER (OUTPATIENT)
Age: 66
End: 2021-12-25

## 2022-01-10 ENCOUNTER — APPOINTMENT (OUTPATIENT)
Dept: GASTROENTEROLOGY | Facility: CLINIC | Age: 67
End: 2022-01-10
Payer: MEDICARE

## 2022-01-10 VITALS
TEMPERATURE: 97.5 F | HEIGHT: 65 IN | WEIGHT: 133 LBS | HEART RATE: 86 BPM | OXYGEN SATURATION: 98 % | SYSTOLIC BLOOD PRESSURE: 120 MMHG | BODY MASS INDEX: 22.16 KG/M2 | DIASTOLIC BLOOD PRESSURE: 85 MMHG

## 2022-01-10 DIAGNOSIS — R10.31 RIGHT LOWER QUADRANT PAIN: ICD-10-CM

## 2022-01-10 DIAGNOSIS — R39.9 UNSPECIFIED SYMPTOMS AND SIGNS INVOLVING THE GENITOURINARY SYSTEM: ICD-10-CM

## 2022-01-10 DIAGNOSIS — R10.32 RIGHT LOWER QUADRANT PAIN: ICD-10-CM

## 2022-01-10 PROCEDURE — 99214 OFFICE O/P EST MOD 30 MIN: CPT

## 2022-01-10 RX ORDER — ECONAZOLE NITRATE 10 MG/G
1 CREAM TOPICAL
Refills: 0 | Status: ACTIVE | COMMUNITY

## 2022-01-10 RX ORDER — LANCETS 33 GAUGE
EACH MISCELLANEOUS
Qty: 100 | Refills: 0 | Status: ACTIVE | COMMUNITY
Start: 2021-07-22

## 2022-01-10 NOTE — HISTORY OF PRESENT ILLNESS
[None] : had no significant interval events [Nausea] : denies nausea [Vomiting] : denies vomiting [Diarrhea] : denies diarrhea [Yellow Skin Or Eyes (Jaundice)] : denies jaundice [Rectal Pain] : denies rectal pain [Heartburn] : heartburn [Constipation] : constipation [Abdominal Pain] : abdominal pain [Abdominal Swelling] : abdominal swelling [GERD] : gastroesophageal reflux disease [Hiatus Hernia] : hiatus hernia [Peptic Ulcer Disease] : peptic ulcer disease [Cholelithiasis] : cholelithiasis [Cholecystectomy] : cholecystectomy [Wt Gain ___ Lbs] : no recent weight gain [Wt Loss ___ Lbs] : no recent weight loss [Pancreatitis] : no pancreatitis [Kidney Stone] : no kidney stone [Inflammatory Bowel Disease] : no inflammatory bowel disease [Irritable Bowel Syndrome] : no irritable bowel syndrome [Diverticulitis] : no diverticulitis [Alcohol Abuse] : no alcohol abuse [Malignancy] : no malignancy [Abdominal Surgery] : no abdominal surgery [Appendectomy] : no appendectomy [de-identified] : The blood work performed on April 6, 2021 revealed normal liver enzymes with a total bilirubin of 0.4 mg/dL, a normal alkaline phosphatase/AST/ALT/GGTP of 59/24/22/20 U/L, respectively, a normal total cholesterol of 167 mg/dL, and elevated triglyceride level of 194 mg/dL, no evidence of anemia with a hemoglobin/hematocrit level of 15.9/47.0, respectively, and elevated blood glucose of 115 mg/dL.  The urinalysis performed on April 6, 2021 revealed a normal urinalysis.  The repeat liver enzymes performed on January 4, 2022 revealed a normal total bilirubin of 0.4 mg/dL, a normal AST/ALT/GGTP of 24/28/24 U/L, respectively, a normal total cholesterol of 170 mg/dL, a normal triglyceride level of 85 mg/dL. [de-identified] : The patient denies any exposure to COVID 19 infection. The patient denies any recent viral illness. The patient states that he is feeling better. The patient denies any jaundice or pruritus.  The patient denies any chronic lower back pain.  The patient complains of urinary frequency, urine urgency, urinary incontinence, polyuria, nocturia, but denies any dysuria or hematuria.  The patient is to be evaluated by a urologist.  The patient complains of abdominal pain.  The patient describes the abdominal pain as a crampy, intermittent lower abdominal discomfort that is nonradiating in nature.  The abdominal pain is unrelated to meals or passing gas or having bowel movements.  The abdominal pain is described as mild to moderate in nature.  The abdominal pain occurs at night and in the morning.  The abdominal pain can occur at any time.   The abdominal pain has awakened the patient from sleep.  The abdominal pain is not relieved with medication.  The abdominal pain is associated with abdominal gas and bloating.  The patient denies any nausea or vomiting.  The patient complains of gastroesophageal reflux disease, early satiety and dysphagia.  The dysphagia is worse with solids but unrelated to liquids. The gastroesophageal reflux disease is worse after meals and late at night and in the early morning. The gastroesophageal reflux disease is slightly improved with proton pump inhibitors, H2 blockers and antacids.   The patient denies any atypical chest pain, shortness of breath or palpitations.  The patient denies any diaphoresis. The patient complains of occasional constipation but denies any diarrhea.  The patient has 1 to 2 bowel movements a day. The patient complains of a change in bowel habits.  The patient complains of a change in caliber of stool.  The patient denies having mucus discharge with the bowel movements.  The patient denies any bright red blood per rectum, melena or hematemesis.  The patient complains of rectal pain and rectal pruritus.  The patient complains of early satiety but denies any weight loss or anorexia.  He denies any fevers or chills.  The patient had an [place] [facility] [place] on September 17, 2020. The upper endoscopy was performed up to the level of the second portion of the duodenum. The upper endoscopy revealed a hiatal hernia and mild diffuse bile gastritis. Biopsies were taken of the distal esophagus, antrum, body of stomach and duodenum to assess for esophagitis, gastritis and duodenitis. The pathology revealed distal esophagus with squamous mucosa with reactive changes that was negative for metaplasia or eosinophilia with no evidence of fungal infection, gastric antral and body mucosa with benign antral and oxyntic mucosa with patchy, nonspecific inflammation with mild foveolar hyperplasia, mild mucosal fibrosis and fundic gland polyps that was negative for Helicobacter pylori and benign small intestinal mucosa with intact villous architecture with no histologic evidence of celiac disease. The patient tolerated the procedure well. The CAT scan of the abdomen and pelvis with IV contrast performed on November 16, 2020 revealed a mildly prominent appendix measuring 7 mm with no wall thickening or periappendiceal edema. Also noted was constipated colon. The blood work performed on April 6, 2021 revealed normal liver enzymes with a total bilirubin of 0.4 mg/dL, a normal alkaline phosphatase/AST/ALT/GGTP of 59/24/22/20 U/L, respectively, a normal total cholesterol of 167 mg/dL, and elevated triglyceride level of 194 mg/dL, no evidence of anemia with a hemoglobin/hematocrit level of 15.9/47.0, respectively, and elevated blood glucose of 115 mg/dL.  The urinalysis performed on April 6, 2021 revealed a normal urinalysis.  The repeat liver enzymes performed on January 4, 2022 revealed a normal total bilirubin of 0.4 mg/dL, a normal AST/ALT/GGTP of 24/28/24 U/L, respectively, a normal total cholesterol of 170 mg/dL, a normal triglyceride level of 85 mg/dL. The blood work performed on November 2, 2020 revealed an elevated potassium of 5.6 mmol/L, an elevated glucose of 102 mg/dl, an elevated calcium of 11.0 mg/dl and an elevated lipase of 64 U/L. The blood work performed on August 24, 2020 revealed an elevated glucose of 107 mg/dl, an elevated lipase of 64 U/L, a reactive hepatitis A IgG antibody, a normal CEA level of 1.5 ng/ml and normal CA 19-9 of 14 U/ml. The blood work performed by his PMD on Kathy 10, 2020 revealed an elevated Hgb A1c of 6.7% and normal liver enzymes. The patient had a prior history of cholecystectomy secondary to biliary colic. The patient admits to having a prior upper endoscopy and colonoscopy performed by another gastroenterologist, Dr. Syed Horne. According to the patient, the upper endoscopic findings were unknown to the patient. The colonoscopic findings revealed mild diverticulosis. The patient admits to a family history of GI problems. The patient’s mother had a history of liver cirrhosis with esophageal varices at age 51.  [de-identified] : (-) smoking, (-) ETOH, (-) IVDA\par

## 2022-01-10 NOTE — ASSESSMENT
[FreeTextEntry1] : Abdominal Pain: The patient complains of abdominal pain. The patient is to avoid nonsteroidal anti-inflammatory drugs and aspirin.  I recommend a trial of Phazyme 1 tablet PO 3 times a day for 3 months for the symptoms.\par Dyspepsia: The patient complains of dyspeptic symptoms.  The patient was advised to abide by an anti-gas (low FOD-MAP) diet.  The patient was given a pamphlet for anti-gas (low FOD-MAP).  The patient and I reviewed the anti-gas (low FOD-MAP) diet at length. The patient is to start on a trial of Simethicone one tablet 4 times a day p.r.n. abdominal pain and gas.\par GERD: The patient was advised to avoid late-night meals and dietary indiscretions.  The patient was advised to avoid fried and fatty foods.  The patient was advised to abide by an anti-GERD diet. The patient was given a pamphlet for anti-GERD.  The patient and I reviewed the anti-GERD diet at length. I recommend a trial of Pantoprazole 40 mg once a day x 3 months for the symptoms.\par Urinary Tract Symptoms:  The patient denies any chronic lower back pain.  The patient complains of urinary frequency, urine urgency, urinary incontinence, polyuria, nocturia, but denies any dysuria or hematuria.  The patient is to be evaluated by a urologist. I recommend followup with the urologist regarding the symptoms.\par Blood Work: I recommend blood work to assess the patient's symptoms. I recommend a PSA to assess the symptoms.  I reviewed the recent blood work performed by the patient's PMD.\par Imaging Study: I recommend an imaging study to assess the symptoms. I recommend an abdominal and pelvic ultrasound to assess the abdominal pain, kidney and bladder.\par I recommend followup with the urologist regarding the symptoms.\par Prior Endoscopic Procedures: The patient had a prior upper endoscopy and colonoscopy performed by another gastroenterologist, Terrell Horne. I will try to obtain the prior upper endoscopy and colonoscopy reports. The patient is to sign a record release to obtain those records.\par History of Peptic Ulcer Disease: The patient has a prior history of peptic ulcer disease. The patient is to avoid nonsteroidal anti-inflammatory drugs and aspirin. There were no ulcers on present upper endoscopy. The patient agreed and will follow-up to reassess the symptoms.\par Hiatal Hernia: The patient was advised to avoid late-night meals and dietary indiscretions. The patient was advised to avoid fried and fatty foods. The patient was advised to abide by an anti-GERD diet. The patient was given a pamphlet for anti-GERD. The patient and I reviewed the anti-GERD diet at length. I recommend a trial of Pantoprazole 40 mg once a day for 3 months for the symptoms.\par Gastric Polyps:   The patient was found to have gastric polyps on prior upper endoscopy.  The pathology revealed fundic gland polyps.  The patient and I discussed the risks of fundic gland polyps.  The patient was told of the possibility of increasing size and bleeding secondary to gastric polyps.  The patient was advised to follow up in the office to reassess the gastric polyps.\par Follow-up: The patient is to follow-up in the office in 3 months to reassess the symptoms. The patient was told to call the office if any further problems. \par \par

## 2022-01-12 ENCOUNTER — NON-APPOINTMENT (OUTPATIENT)
Age: 67
End: 2022-01-12

## 2022-01-12 LAB — PSA SERPL-MCNC: 1.03 NG/ML

## 2022-01-14 ENCOUNTER — APPOINTMENT (OUTPATIENT)
Dept: UROLOGY | Facility: CLINIC | Age: 67
End: 2022-01-14
Payer: MEDICARE

## 2022-01-14 PROCEDURE — 99204 OFFICE O/P NEW MOD 45 MIN: CPT

## 2022-01-14 RX ORDER — TAMSULOSIN HYDROCHLORIDE 0.4 MG/1
0.4 CAPSULE ORAL
Qty: 60 | Refills: 2 | Status: ACTIVE | COMMUNITY
Start: 2022-01-14 | End: 1900-01-01

## 2022-01-17 LAB
APPEARANCE: CLEAR
BACTERIA: NEGATIVE
BILIRUBIN URINE: NEGATIVE
BLOOD URINE: NEGATIVE
COLOR: NORMAL
GLUCOSE QUALITATIVE U: NEGATIVE
HYALINE CASTS: 0 /LPF
KETONES URINE: NEGATIVE
LEUKOCYTE ESTERASE URINE: NEGATIVE
MICROSCOPIC-UA: NORMAL
NITRITE URINE: NEGATIVE
PH URINE: 6
PROTEIN URINE: NORMAL
RED BLOOD CELLS URINE: 1 /HPF
SPECIFIC GRAVITY URINE: 1.03
SQUAMOUS EPITHELIAL CELLS: 0 /HPF
UROBILINOGEN URINE: NORMAL
WHITE BLOOD CELLS URINE: 1 /HPF

## 2022-01-17 NOTE — ASSESSMENT
[FreeTextEntry1] : elevated residual \par coppola start tamsulosin \par side effects reviewed\par f/u next week check pvr

## 2022-01-17 NOTE — REVIEW OF SYSTEMS
[Wake up at night to urinate  How many times?  ___] : wakes up to urinate [unfilled] times during the night [Strain or push to urinate] : strain or push to urinate [Wait a long time to urinate] : waits a long time to urinate [Slow urine stream] : slow urine stream [Interrupted urine stream] : interrupted urine stream [Negative] : Heme/Lymph [FreeTextEntry2] : frequent urination

## 2022-01-17 NOTE — HISTORY OF PRESENT ILLNESS
[FreeTextEntry1] : cc bph\par pt is  yo male referred for bph . The patient reports frequency, incomplete emptying, intermittency, straining, urgency and weak stream . He denies hematuria and dysuria  The patient states symptoms are of mod  severity. The symptoms have been present for months. He reports a history of no complicating symptoms. He denies flank pain, gross hematuria, kidney stones and recurrent UTI.\par  Prior treatments include none\par last psa 1.03 1/22\par The is no family history of prostate cancer\par

## 2022-01-21 ENCOUNTER — APPOINTMENT (OUTPATIENT)
Dept: UROLOGY | Facility: CLINIC | Age: 67
End: 2022-01-21
Payer: MEDICARE

## 2022-01-21 VITALS — RESPIRATION RATE: 15 BRPM | DIASTOLIC BLOOD PRESSURE: 84 MMHG | SYSTOLIC BLOOD PRESSURE: 132 MMHG | HEART RATE: 90 BPM

## 2022-01-21 VITALS — TEMPERATURE: 96.8 F

## 2022-01-21 PROCEDURE — 99213 OFFICE O/P EST LOW 20 MIN: CPT

## 2022-01-21 NOTE — ASSESSMENT
[FreeTextEntry1] : post void today 30 ml \par will reduce dose to 0.4 mg \par if still bothersome side effects will switch to finasteride

## 2022-01-21 NOTE — HISTORY OF PRESENT ILLNESS
[FreeTextEntry1] : cc bph\par pt is yo male referred for bph . The patient reports frequency, incomplete emptying, intermittency, straining, urgency and weak stream . He denies hematuria and dysuria The patient states symptoms are of mod severity. The symptoms have been present for months. He reports a history of no complicating symptoms. He denies flank pain, gross hematuria, kidney stones and recurrent UTI.\par  Prior treatments include tamsulosin 0.8 took a few times but became dizzy \par urine flow did improve \par last psa 1.03 1/22\par The is no family history of prostate cancer\par \par

## 2022-01-25 ENCOUNTER — RX RENEWAL (OUTPATIENT)
Age: 67
End: 2022-01-25

## 2022-02-24 ENCOUNTER — RX RENEWAL (OUTPATIENT)
Age: 67
End: 2022-02-24

## 2022-03-28 ENCOUNTER — RX RENEWAL (OUTPATIENT)
Age: 67
End: 2022-03-28

## 2022-04-04 ENCOUNTER — TRANSCRIPTION ENCOUNTER (OUTPATIENT)
Age: 67
End: 2022-04-04

## 2022-04-11 ENCOUNTER — APPOINTMENT (OUTPATIENT)
Dept: GASTROENTEROLOGY | Facility: CLINIC | Age: 67
End: 2022-04-11
Payer: MEDICARE

## 2022-04-11 VITALS
SYSTOLIC BLOOD PRESSURE: 129 MMHG | DIASTOLIC BLOOD PRESSURE: 86 MMHG | HEIGHT: 65 IN | WEIGHT: 135 LBS | BODY MASS INDEX: 22.49 KG/M2 | OXYGEN SATURATION: 98 % | TEMPERATURE: 97 F | HEART RATE: 95 BPM

## 2022-04-11 PROCEDURE — 99214 OFFICE O/P EST MOD 30 MIN: CPT

## 2022-04-11 RX ORDER — SIMETHICONE 180 MG
180 CAPSULE ORAL 4 TIMES DAILY
Qty: 120 | Refills: 3 | Status: ACTIVE | COMMUNITY
Start: 2022-04-11 | End: 1900-01-01

## 2022-04-11 RX ORDER — SITAGLIPTIN AND METFORMIN HYDROCHLORIDE 50; 1000 MG/1; MG/1
50-1000 TABLET, FILM COATED, EXTENDED RELEASE ORAL
Qty: 180 | Refills: 0 | Status: ACTIVE | COMMUNITY
Start: 2022-04-07

## 2022-04-11 NOTE — ASSESSMENT
[FreeTextEntry1] : Dyspepsia: The patient complains of dyspeptic symptoms.  The patient was advised to continue to abide by an anti-gas (low FOD-MAP) diet.  The patient was previously given a pamphlet for anti-gas (low FOD-MAP).  The patient and I reviewed the anti-gas (low FOD-MAP)diet at length again. The patient is to continue on a trial of Simethicone one tablet 4 times a day p.r.n. abdominal pain and gas.\par GERD: The patient was advised to avoid late-night meals and dietary indiscretions.  The patient was advised to avoid fried and fatty foods.  The patient was advised to abide by an anti-GERD diet. The patient was given a pamphlet for anti-GERD.  The patient and I reviewed the anti-GERD diet at length. I recommend a trial of Pantoprazole 40 mg once a day x 3 months for the symptoms.\par Constipation: The patient complains of constipation. I recommend a high-fiber diet. I recommend a trial of a probiotic such as Align once a day. I recommend a trial of Metamucil once a day for fiber supplementation. \par Urinary Tract Symptoms: The patient denies any chronic lower back pain. The patient complains of urinary frequency, urine urgency, urinary incontinence, polyuria, nocturia, but denies any dysuria or hematuria. The patient is to be evaluated by a urologist. I recommend followup with the urologist regarding the symptoms.\par Blood Work: I recommend blood work to assess the patient's symptoms. I recommend a PSA to assess the symptoms. I reviewed the recent blood work performed by the patient's PMD.\par Imaging Study: I recommend an imaging study to assess the symptoms. The patint failed to followup for the ultrasounds secondary to palpitations.  The patient was advised to see his cardiologist regarding the palpitations. I recommend reordering the abdominal and pelvic ultrasound to assess the abdominal pain, kidney and bladder.\par I recommend followup with the urologist regarding the symptoms.\par Prior Endoscopic Procedures: The patient had a prior upper endoscopy and colonoscopy performed by another gastroenterologist, Terrell Horne. I will try to obtain the prior upper endoscopy and colonoscopy reports. The patient is to sign a record release to obtain those records.\par History of Peptic Ulcer Disease: The patient has a prior history of peptic ulcer disease. The patient is to avoid nonsteroidal anti-inflammatory drugs and aspirin. There were no ulcers on present upper endoscopy. The patient agreed and will follow-up to reassess the symptoms.\par Hiatal Hernia: The patient was advised to avoid late-night meals and dietary indiscretions. The patient was advised to avoid fried and fatty foods. The patient was advised to abide by an anti-GERD diet. The patient was given a pamphlet for anti-GERD. The patient and I reviewed the anti-GERD diet at length. I recommend a trial of Pantoprazole 40 mg once a day for 3 months for the symptoms.\par Gastric Polyps: The patient was found to have gastric polyps on prior upper endoscopy. The pathology revealed fundic gland polyps. The patient and I discussed the risks of fundic gland polyps. The patient was told of the possibility of increasing size and bleeding secondary to gastric polyps. The patient was advised to follow up in the office to reassess the gastric polyps.\par Follow-up: The patient is to follow-up in the office in 6 months to reassess the symptoms. The patient was told to call the office if any further problems. \par \par

## 2022-04-11 NOTE — HISTORY OF PRESENT ILLNESS
[None] : had no significant interval events [Nausea] : denies nausea [Vomiting] : denies vomiting [Diarrhea] : denies diarrhea [Yellow Skin Or Eyes (Jaundice)] : denies jaundice [Abdominal Pain] : denies abdominal pain [Rectal Pain] : denies rectal pain [Heartburn] : heartburn [Constipation] : constipation [Abdominal Swelling] : abdominal swelling [GERD] : gastroesophageal reflux disease [Hiatus Hernia] : hiatus hernia [Peptic Ulcer Disease] : peptic ulcer disease [Cholelithiasis] : cholelithiasis [Kidney Stone] : kidney stone [Cholecystectomy] : cholecystectomy [Wt Gain ___ Lbs] : no recent weight gain [Wt Loss ___ Lbs] : no recent weight loss [Pancreatitis] : no pancreatitis [Inflammatory Bowel Disease] : no inflammatory bowel disease [Irritable Bowel Syndrome] : no irritable bowel syndrome [Diverticulitis] : no diverticulitis [Alcohol Abuse] : no alcohol abuse [Malignancy] : no malignancy [Abdominal Surgery] : no abdominal surgery [Appendectomy] : no appendectomy [de-identified] : The patient states that he is feeling better. The patient denies any jaundice or pruritus.  The patient complains of occasional lower back pain. The patient denies any abdominal pain.  The patient complains of abdominal gas and bloating.  The patient denies any nausea or vomiting.  The patient complains of occasional gastroesophageal reflux disease but denies any dysphagia.  The gastroesophageal reflux disease is worse after meals and late at night and in the early morning. The gastroesophageal reflux disease is improved with proton pump inhibitors, H2 blockers and antacids.   The patient complains of occasional palpitations but denies any atypical chest pain or shortness of breath.  The patient denies any diaphoresis. The patient complains of constipation but denies any diarrhea.  The patient has 1 bowel movement a day. The patient complains of a change in bowel habits.  The patient complains of a change in caliber of stool.   The patient denies having mucus discharge with the bowel movements.  The patient denies any bright red blood per rectum, melena or hematemesis. The patient denies any rectal pain or rectal pruritus.  The patient denies any weight loss or anorexia.  He denies any fevers or chills.  The blood test performed on January 11, 2022 revealed a normal PSA of 1.03 ng/mL. The patient was advised to follow-up with the urologist, Dr. Ramiro Underwood regarding the urinary frequency and urinary urgency.The patient had an upper endoscopy at the [facility] end[facility] suite on September 17, 2020. The upper endoscopy was performed up to the level of the second portion of the duodenum. The upper endoscopy revealed a hiatal hernia and mild diffuse bile gastritis. Biopsies were taken of the distal esophagus, antrum, body of stomach and duodenum to assess for esophagitis, gastritis and duodenitis. The pathology revealed distal esophagus with squamous mucosa with reactive changes that was negative for metaplasia or eosinophilia with no evidence of fungal infection, gastric antral and body mucosa with benign antral and oxyntic mucosa with patchy, nonspecific inflammation with mild foveolar hyperplasia, mild mucosal fibrosis and fundic gland polyps that was negative for Helicobacter pylori and benign small intestinal mucosa with intact villous architecture with no histologic evidence of celiac disease. The patient tolerated the procedure well. The CAT scan of the abdomen and pelvis with IV contrast performed on November 16, 2020 revealed a mildly prominent appendix measuring 7 mm with no wall thickening or periappendiceal edema. Also noted was constipated colon. The blood work performed on April 6, 2021 revealed normal liver enzymes with a total bilirubin of 0.4 mg/dL, a normal alkaline phosphatase/AST/ALT/GGTP of 59/24/22/20 U/L, respectively, a normal total cholesterol of 167 mg/dL, and elevated triglyceride level of 194 mg/dL, no evidence of anemia with a hemoglobin/hematocrit level of 15.9/47.0, respectively, and elevated blood glucose of 115 mg/dL. The urinalysis performed on April 6, 2021 revealed a normal urinalysis. The repeat liver enzymes performed on January 4, 2022 revealed a normal total bilirubin of 0.4 mg/dL, a normal AST/ALT/GGTP of 24/28/24 U/L, respectively, a normal total cholesterol of 170 mg/dL, a normal triglyceride level of 85 mg/dL. The blood work performed on November 2, 2020 revealed an elevated potassium of 5.6 mmol/L, an elevated glucose of 102 mg/dl, an elevated calcium of 11.0 mg/dl and an elevated lipase of 64 U/L. The blood work performed on August 24, 2020 revealed an elevated glucose of 107 mg/dl, an elevated lipase of 64 U/L, a reactive hepatitis A IgG antibody, a normal CEA level of 1.5 ng/ml and normal CA 19-9 of 14 U/ml. The blood work performed by his PMD on Kathy 10, 2020 revealed an elevated Hgb A1c of 6.7% and normal liver enzymes. The patient had a prior history of cholecystectomy secondary to biliary colic. The patient admits to having a prior upper endoscopy and colonoscopy performed by another gastroenterologist, Dr. Syed Horne. According to the patient, the upper endoscopic findings were unknown to the patient. The colonoscopic findings revealed mild diverticulosis. The patient admits to a family history of GI problems. The patient’s mother had a history of liver cirrhosis with esophageal varices at age 51.  [de-identified] : (-) smoking, (-) ETOH, (-) IVDA\par

## 2022-04-20 ENCOUNTER — RX RENEWAL (OUTPATIENT)
Age: 67
End: 2022-04-20

## 2022-04-25 ENCOUNTER — RX RENEWAL (OUTPATIENT)
Age: 67
End: 2022-04-25

## 2022-04-29 ENCOUNTER — APPOINTMENT (OUTPATIENT)
Dept: ULTRASOUND IMAGING | Facility: HOSPITAL | Age: 67
End: 2022-04-29

## 2022-04-29 ENCOUNTER — OUTPATIENT (OUTPATIENT)
Dept: OUTPATIENT SERVICES | Facility: HOSPITAL | Age: 67
LOS: 1 days | End: 2022-04-29
Payer: MEDICARE

## 2022-04-29 DIAGNOSIS — R10.11 RIGHT UPPER QUADRANT PAIN: ICD-10-CM

## 2022-04-29 DIAGNOSIS — R10.13 EPIGASTRIC PAIN: ICD-10-CM

## 2022-04-29 DIAGNOSIS — R10.31 RIGHT LOWER QUADRANT PAIN: ICD-10-CM

## 2022-04-29 PROCEDURE — 76856 US EXAM PELVIC COMPLETE: CPT

## 2022-04-29 PROCEDURE — 76856 US EXAM PELVIC COMPLETE: CPT | Mod: 26

## 2022-04-29 PROCEDURE — 76700 US EXAM ABDOM COMPLETE: CPT

## 2022-04-29 PROCEDURE — 76700 US EXAM ABDOM COMPLETE: CPT | Mod: 26

## 2022-05-02 ENCOUNTER — NON-APPOINTMENT (OUTPATIENT)
Age: 67
End: 2022-05-02

## 2022-05-04 ENCOUNTER — NON-APPOINTMENT (OUTPATIENT)
Age: 67
End: 2022-05-04

## 2022-05-05 ENCOUNTER — NON-APPOINTMENT (OUTPATIENT)
Age: 67
End: 2022-05-05

## 2022-05-06 ENCOUNTER — NON-APPOINTMENT (OUTPATIENT)
Age: 67
End: 2022-05-06

## 2022-07-15 ENCOUNTER — APPOINTMENT (OUTPATIENT)
Dept: GASTROENTEROLOGY | Facility: CLINIC | Age: 67
End: 2022-07-15

## 2022-07-15 ENCOUNTER — LABORATORY RESULT (OUTPATIENT)
Age: 67
End: 2022-07-15

## 2022-07-15 ENCOUNTER — RX RENEWAL (OUTPATIENT)
Age: 67
End: 2022-07-15

## 2022-07-15 VITALS
OXYGEN SATURATION: 98 % | HEIGHT: 65 IN | BODY MASS INDEX: 22.33 KG/M2 | DIASTOLIC BLOOD PRESSURE: 86 MMHG | SYSTOLIC BLOOD PRESSURE: 125 MMHG | WEIGHT: 134 LBS | HEART RATE: 71 BPM | TEMPERATURE: 97.5 F

## 2022-07-15 PROCEDURE — 99214 OFFICE O/P EST MOD 30 MIN: CPT

## 2022-07-15 RX ORDER — FAMOTIDINE 40 MG/1
40 TABLET, FILM COATED ORAL
Qty: 180 | Refills: 0 | Status: ACTIVE | COMMUNITY
Start: 2022-07-15 | End: 1900-01-01

## 2022-07-15 RX ORDER — CICLOPIROX 80 MG/ML
8 SOLUTION TOPICAL
Qty: 7 | Refills: 0 | Status: ACTIVE | COMMUNITY
Start: 2022-05-19

## 2022-07-15 RX ORDER — BLOOD SUGAR DIAGNOSTIC
STRIP MISCELLANEOUS
Qty: 100 | Refills: 0 | Status: ACTIVE | COMMUNITY
Start: 2021-07-22

## 2022-07-15 RX ORDER — METFORMIN HYDROCHLORIDE 500 MG/1
500 TABLET, COATED ORAL
Qty: 360 | Refills: 0 | Status: ACTIVE | COMMUNITY
Start: 2022-04-21

## 2022-07-15 RX ORDER — TRIAMCINOLONE ACETONIDE 1 MG/G
0.1 OINTMENT TOPICAL
Qty: 80 | Refills: 0 | Status: ACTIVE | COMMUNITY
Start: 2022-05-19

## 2022-07-15 RX ORDER — SITAGLIPTIN 50 MG/1
50 TABLET, FILM COATED ORAL
Qty: 90 | Refills: 0 | Status: ACTIVE | COMMUNITY
Start: 2022-06-21

## 2022-07-15 NOTE — ASSESSMENT
[FreeTextEntry1] : Dyspepsia: The patient complains of dyspeptic symptoms.  The patient was advised to continue to abide by an anti-gas (low FOD-MAP) diet.  The patient was previously given a pamphlet for anti-gas (low FOD-MAP).  The patient and I reviewed the anti-gas (low FOD-MAP)diet at length again. The patient is to continue on a trial of Simethicone one tablet 4 times a day p.r.n. abdominal pain and gas.\par GERD: The patient was advised to avoid late-night meals and dietary indiscretions.  The patient was advised to avoid fried and fatty foods.  The patient was advised to abide by an anti-GERD diet. The patient was given a pamphlet for anti-GERD.  The patient and I reviewed the anti-GERD diet at length. I recommend a trial of famotidine 40 mg twice a day x 3 months for the symptoms.\par Atypical Chest Pain: The patient complains of atypical chest pain of unclear etiology.  The patient was advised to follow up with the PMD and cardiologist regarding evaluation for the atypical chest pain. The patient was told of possible etiologies such as cardiac, pulmonary, GI, musculoskeletal, stress and other causes for the atypical chest pain.  The patient agrees and will follow-up with the PMD and cardiologist. \par Fatty Liver:  The patient had an imaging study suggestive of fatty infiltration of the liver.  The patient denies any jaundice or pruritus.  The patient denies any alcohol use.  The patient denies taking large doses of nonsteroidal anti-inflammatory drugs or acetaminophen.  The findings are suggestive of fatty liver.  The patient and I had a long discussion regarding the risks of fatty liver and possible progression to cirrhosis.  The patient was told of the possible increased risk of developing liver failure, cirrhosis, ascites, GI bleeding secondary to varices, hepatic encephalopathy, bleeding tendencies and liver cancer.  The patient was told of the importance of follow-up.  The patient was advised to follow up every 6 months for blood work and imaging studies. The patient agreed and will follow up. The patient was advised to lose weight and exercise.   The patient was advised to abide by a Mediterranean diet. I recommend a trial of vitamin E supplementation for the fatty liver.  If the liver enzymes remain elevated, the patient may require a trial of Pioglitazone for the fatty liver. I recommend avoid alcohol and hepato-toxic agents.  The patient was also advised to avoid NSAIDs, Acetaminophen and any other hepatotoxic drugs. The patient was also advised not to share needles, razors, scissors, nail clippers, etc.. The patient is to continue close follow-up in our office for blood work and exams.  If the liver enzymes remain elevated, the patient may require a CT guided liver biopsy to assess the liver parenchyma for possible treatment.  We had a long discussion regarding the risks and benefits of the procedure.  The patient was told of the risks of bleeding, perforation, infections, emergency surgery and missing lesions.  The patient agreed and will follow-up to reassess the symptoms.  I recommend a CBC, SMA 24, amylase, lipase, ESR, TFTs, SANDI, rheumatoid factor, celiac sprue panel, IgA, profile for hepatitis A, B, C., AFP, alpha 1 anti-trypsin antibody, ceruloplasmin level, iron, TIBC, ferritin level, AMA, anti-smooth muscle antibody and PT/INR/PTT.  \par Urinary Tract Symptoms: The patient denies any chronic lower back pain. The patient complains of urinary frequency, urine urgency, urinary incontinence, polyuria, nocturia, but denies any dysuria or hematuria. The patient is being followed by a urologist, Dr. Underwood. I recommend followup with the urologist regarding the symptoms.\par Imaging Study: I recommend an imaging study to assess the symptoms. The patient failed to followup for the ultrasounds secondary to palpitations. The patient was again advised to see his cardiologist regarding the palpitations.\par I recommend followup with the urologist, Dr. Underwood regarding the symptoms.\par Prior Endoscopic Procedures: The patient had a prior upper endoscopy and colonoscopy performed by another gastroenterologist, Terrell Horne. I will try to obtain the prior upper endoscopy and colonoscopy reports. The patient is to sign a record release to obtain those records.\par History of Peptic Ulcer Disease: The patient has a prior history of peptic ulcer disease. The patient is to avoid nonsteroidal anti-inflammatory drugs and aspirin. There were no ulcers on present upper endoscopy. The patient agreed and will follow-up to reassess the symptoms.  I recommend a trial of Famotidine 40 mg twice a day for 3 months for the symptoms.\par Hiatal Hernia: The patient was advised to avoid late-night meals and dietary indiscretions. The patient was advised to avoid fried and fatty foods. The patient was advised to abide by an anti-GERD diet. The patient was given a pamphlet for anti-GERD. The patient and I reviewed the anti-GERD diet at length. I recommend a trial of Famotidine 40 mg twice a day for 3 months for the symptoms.\par Gastric Polyps: The patient was found to have gastric polyps on prior upper endoscopy. The pathology revealed fundic gland polyps. The patient and I discussed the risks of fundic gland polyps. The patient was told of the possibility of increasing size and bleeding secondary to gastric polyps. The patient was advised to follow up in the office to reassess the gastric polyps.\par Follow-up: The patient is to follow-up in the office in 6 months to reassess the symptoms. The patient was told to call the office if any further problems. \par \par

## 2022-07-15 NOTE — HISTORY OF PRESENT ILLNESS
[None] : had no significant interval events [Nausea] : denies nausea [Vomiting] : denies vomiting [Diarrhea] : denies diarrhea [Yellow Skin Or Eyes (Jaundice)] : denies jaundice [Abdominal Pain] : denies abdominal pain [Rectal Pain] : denies rectal pain [Heartburn] : heartburn [Constipation] : constipation [Abdominal Swelling] : abdominal swelling [GERD] : gastroesophageal reflux disease [Hiatus Hernia] : hiatus hernia [Peptic Ulcer Disease] : peptic ulcer disease [Cholelithiasis] : cholelithiasis [Kidney Stone] : kidney stone [Cholecystectomy] : cholecystectomy [Wt Gain ___ Lbs] : no recent weight gain [Wt Loss ___ Lbs] : no recent weight loss [Pancreatitis] : no pancreatitis [Inflammatory Bowel Disease] : no inflammatory bowel disease [Irritable Bowel Syndrome] : no irritable bowel syndrome [Diverticulitis] : no diverticulitis [Alcohol Abuse] : no alcohol abuse [Malignancy] : no malignancy [Abdominal Surgery] : no abdominal surgery [Appendectomy] : no appendectomy [de-identified] : The patient has a history of liver disease.  The patient has a history of fatty liver noted on prior imaging study. The patient denies any prior exposure to hepatitis A, B or C.  The patient denies any large doses of nonsteroidal anti-inflammatory drugs or acetaminophen.   The patient denies sharing needles, razors, nail clippers, nail files, scissors, et cetera.  The patient denies any EtOH abuse, cocaine use or intravenous drug use.    The patient denies any prior blood transfusions, sexual indiscretions, tattoos or piercing.  The patient admits to having prior surgery.  The history of surgery is significant for a prior cholecystectomy.  The patient admits to a family history of GI and liver problems. The patient's mother had a history of cirrhosis of the liver.  The patient states that he is feeling fine. The patient denies any jaundice or pruritus.  The patient complains of occasional lower back pain. The patient denies any abdominal pain.  The patient complains of abdominal gas and bloating.  The patient denies any nausea or vomiting.  The patient complains of gastroesophageal reflux disease but denies any dysphagia.  The gastroesophageal reflux disease is worse after meals and late at night and in the early morning. The gastroesophageal reflux disease is improved with proton pump inhibitors, H2 blockers and antacids.   The patient complains of atypical chest pain but denies any shortness of breath or palpitations.  The chest pain is described as a sharp, intermittent right sided chest discomfort that is nonradiating in nature.  The patient denies any diaphoresis. The chest pain is described as 1 to 2 out of 10 in intensity.  The chest pain can occur at any time.  The chest pain is worse at night and early morning.  The chest pain is unrelated to meals and passing gas.  The chest pain has never awakened the patient from sleep.  The patient is to be seen by his cardiologist, Dr. Cachorro Ashley. The patient complains of occasional constipation but denies any diarrhea.  The patient has 2 to 3 bowel movements a day. The patient complains of a change in bowel habits.  The patient complains of a change in caliber of stool.  The patient denies having mucus discharge with the bowel movements.  The patient denies any bright red blood per rectum, melena or hematemesis.  The patient denies any rectal pain or rectal pruritus.  The patient denies any weight loss or anorexia.  He denies any fevers or chills.  The patient had an upper endoscopy at the [facility] GI endoscopy suite on September 17, 2020. The upper endoscopy was performed up to the level of the second portion of the duodenum. The upper endoscopy revealed a hiatal hernia and mild diffuse bile gastritis. Biopsies were taken of the distal esophagus, antrum, body of stomach and duodenum to assess for esophagitis, gastritis and duodenitis. The pathology revealed distal esophagus with squamous mucosa with reactive changes that was negative for metaplasia or eosinophilia with no evidence of fungal infection, gastric antral and body mucosa with benign antral and oxyntic mucosa with patchy, nonspecific inflammation with mild foveolar hyperplasia, mild mucosal fibrosis and fundic gland polyps that was negative for Helicobacter pylori and benign small intestinal mucosa with intact villous architecture with no histologic evidence of celiac disease. The patient tolerated the procedure well. The CAT scan of the abdomen and pelvis with IV contrast performed on November 16, 2020 revealed a mildly prominent appendix measuring 7 mm with no wall thickening or periappendiceal edema. Also noted was constipated colon.  The abdominal ultrasound performed on May 2, 2022 revealed mild hepatic steatosis. Also noted was status post cholecystectomy and a 1.2 cm simple appearing cyst in the lower pole of the left kidney. The pelvic ultrasound performed on April 29, 2022 revealed a mildly enlarged prostate with a postvoid residual of 355 cc. The blood test performed on January 11, 2022 revealed a normal PSA of 1.03 ng/mL. The patient was advised to follow-up with the urologist, Dr. Ramiro Underwood regarding the urinary frequency and urinary urgency. The blood work performed on April 6, 2021 revealed normal liver enzymes with a total bilirubin of 0.4 mg/dL, a normal alkaline phosphatase/AST/ALT/GGTP of 59/24/22/20 U/L, respectively, a normal total cholesterol of 167 mg/dL, and elevated triglyceride level of 194 mg/dL, no evidence of anemia with a hemoglobin/hematocrit level of 15.9/47.0, respectively, and elevated blood glucose of 115 mg/dL. The urinalysis performed on April 6, 2021 revealed a normal urinalysis. The repeat liver enzymes performed on January 4, 2022 revealed a normal total bilirubin of 0.4 mg/dL, a normal AST/ALT/GGTP of 24/28/24 U/L, respectively, a normal total cholesterol of 170 mg/dL, a normal triglyceride level of 85 mg/dL. The blood work performed on November 2, 2020 revealed an elevated potassium of 5.6 mmol/L, an elevated glucose of 102 mg/dl, an elevated calcium of 11.0 mg/dl and an elevated lipase of 64 U/L. The blood work performed on August 24, 2020 revealed an elevated glucose of 107 mg/dl, an elevated lipase of 64 U/L, a reactive hepatitis A IgG antibody, a normal CEA level of 1.5 ng/ml and normal CA 19-9 of 14 U/ml. The blood work performed by his PMD on Kathy 10, 2020 revealed an elevated Hgb A1c of 6.7% and normal liver enzymes. The patient had a prior history of cholecystectomy secondary to biliary colic. The patient admits to having a prior upper endoscopy and colonoscopy performed by another gastroenterologist, Dr. Syed Horne. According to the patient, the upper endoscopic findings were unknown to the patient. The colonoscopic findings revealed mild diverticulosis. The patient admits to a family history of GI problems. The patient’s mother had a history of liver cirrhosis with esophageal varices at age 51.  [de-identified] : (-) smoking, (-) ETOH, (-) IVDA\par

## 2022-07-18 ENCOUNTER — NON-APPOINTMENT (OUTPATIENT)
Age: 67
End: 2022-07-18

## 2022-07-19 ENCOUNTER — NON-APPOINTMENT (OUTPATIENT)
Age: 67
End: 2022-07-19

## 2022-07-20 ENCOUNTER — NON-APPOINTMENT (OUTPATIENT)
Age: 67
End: 2022-07-20

## 2022-07-22 ENCOUNTER — NON-APPOINTMENT (OUTPATIENT)
Age: 67
End: 2022-07-22

## 2022-07-22 LAB
A1AT SERPL-MCNC: 134 MG/DL
AFP-TM SERPL-MCNC: <1.8 NG/ML
ALBUMIN SERPL ELPH-MCNC: 4.9 G/DL
ALP BLD-CCNC: 54 U/L
ALT SERPL-CCNC: 27 U/L
AMYLASE/CREAT SERPL: 141 U/L
ANA SER IF-ACNC: NEGATIVE
ANION GAP SERPL CALC-SCNC: 15 MMOL/L
AST SERPL-CCNC: 25 U/L
BASOPHILS # BLD AUTO: 0.05 K/UL
BASOPHILS NFR BLD AUTO: 0.6 %
BILIRUB SERPL-MCNC: 0.4 MG/DL
BUN SERPL-MCNC: 13 MG/DL
CALCIUM SERPL-MCNC: 10.4 MG/DL
CHLORIDE SERPL-SCNC: 98 MMOL/L
CHOLEST SERPL-MCNC: 149 MG/DL
CO2 SERPL-SCNC: 25 MMOL/L
CREAT SERPL-MCNC: 0.9 MG/DL
EGFR: 94 ML/MIN/1.73M2
EOSINOPHIL # BLD AUTO: 0.21 K/UL
EOSINOPHIL NFR BLD AUTO: 2.6 %
ERYTHROCYTE [SEDIMENTATION RATE] IN BLOOD BY WESTERGREN METHOD: 3 MM/HR
FERRITIN SERPL-MCNC: 46 NG/ML
GGT SERPL-CCNC: 20 U/L
GLIADIN IGA SER QL: <5 UNITS
GLIADIN IGG SER QL: <5 UNITS
GLIADIN PEPTIDE IGA SER-ACNC: NEGATIVE
GLIADIN PEPTIDE IGG SER-ACNC: NEGATIVE
GLUCOSE SERPL-MCNC: 114 MG/DL
HBV CORE IGG+IGM SER QL: NONREACTIVE
HBV CORE IGM SER QL: NONREACTIVE
HBV E AB SER QL: NONREACTIVE
HBV E AG SER QL: NONREACTIVE
HBV SURFACE AB SER QL: NONREACTIVE
HBV SURFACE AG SER QL: NONREACTIVE
HCT VFR BLD CALC: 48.7 %
HCV AB SER QL: NONREACTIVE
HCV S/CO RATIO: 0.12 S/CO
HDLC SERPL-MCNC: 47 MG/DL
HEPATITIS A IGG ANTIBODY: REACTIVE
HGB BLD-MCNC: 16.1 G/DL
IGA SER QL IEP: 252 MG/DL
IMM GRANULOCYTES NFR BLD AUTO: 0.2 %
IRON SATN MFR SERPL: 23 %
IRON SERPL-MCNC: 74 UG/DL
LDLC SERPL CALC-MCNC: 84 MG/DL
LPL SERPL-CCNC: 179 U/L
LYMPHOCYTES # BLD AUTO: 2.1 K/UL
LYMPHOCYTES NFR BLD AUTO: 25.7 %
MAN DIFF?: NORMAL
MCHC RBC-ENTMCNC: 28.5 PG
MCHC RBC-ENTMCNC: 33.1 GM/DL
MCV RBC AUTO: 86.3 FL
MITOCHONDRIA AB SER IF-ACNC: NORMAL
MONOCYTES # BLD AUTO: 0.57 K/UL
MONOCYTES NFR BLD AUTO: 7 %
NEUTROPHILS # BLD AUTO: 5.21 K/UL
NEUTROPHILS NFR BLD AUTO: 63.9 %
NONHDLC SERPL-MCNC: 102 MG/DL
PLATELET # BLD AUTO: 266 K/UL
POTASSIUM SERPL-SCNC: 5.4 MMOL/L
PROT SERPL-MCNC: 8 G/DL
RBC # BLD: 5.64 M/UL
RBC # FLD: 14.5 %
RHEUMATOID FACT SER QL: 12 IU/ML
SMOOTH MUSCLE AB SER QL IF: NORMAL
SODIUM SERPL-SCNC: 138 MMOL/L
TIBC SERPL-MCNC: 315 UG/DL
TRIGL SERPL-MCNC: 92 MG/DL
TSH SERPL-ACNC: 1.41 UIU/ML
TTG IGA SER IA-ACNC: <1.2 U/ML
TTG IGA SER-ACNC: NEGATIVE
TTG IGG SER IA-ACNC: <1.2 U/ML
TTG IGG SER IA-ACNC: NEGATIVE
UIBC SERPL-MCNC: 241 UG/DL
WBC # FLD AUTO: 8.16 K/UL

## 2022-08-23 ENCOUNTER — RX RENEWAL (OUTPATIENT)
Age: 67
End: 2022-08-23

## 2022-09-19 ENCOUNTER — TRANSCRIPTION ENCOUNTER (OUTPATIENT)
Age: 67
End: 2022-09-19

## 2022-09-23 ENCOUNTER — RX RENEWAL (OUTPATIENT)
Age: 67
End: 2022-09-23

## 2022-11-01 ENCOUNTER — APPOINTMENT (OUTPATIENT)
Dept: CARDIOLOGY | Facility: CLINIC | Age: 67
End: 2022-11-01

## 2022-11-17 ENCOUNTER — RX RENEWAL (OUTPATIENT)
Age: 67
End: 2022-11-17

## 2022-11-17 RX ORDER — PANTOPRAZOLE 40 MG/1
40 TABLET, DELAYED RELEASE ORAL DAILY
Qty: 90 | Refills: 0 | Status: ACTIVE | COMMUNITY
Start: 2022-04-11 | End: 1900-01-01

## 2023-01-13 ENCOUNTER — APPOINTMENT (OUTPATIENT)
Dept: GASTROENTEROLOGY | Facility: CLINIC | Age: 68
End: 2023-01-13
Payer: MEDICARE

## 2023-01-13 VITALS
HEIGHT: 65 IN | OXYGEN SATURATION: 99 % | DIASTOLIC BLOOD PRESSURE: 76 MMHG | SYSTOLIC BLOOD PRESSURE: 115 MMHG | TEMPERATURE: 97.5 F | BODY MASS INDEX: 21.99 KG/M2 | HEART RATE: 86 BPM | WEIGHT: 132 LBS

## 2023-01-13 DIAGNOSIS — K21.9 GASTRO-ESOPHAGEAL REFLUX DISEASE W/OUT ESOPHAGITIS: ICD-10-CM

## 2023-01-13 DIAGNOSIS — Z01.818 ENCOUNTER FOR OTHER PREPROCEDURAL EXAMINATION: ICD-10-CM

## 2023-01-13 PROCEDURE — 99214 OFFICE O/P EST MOD 30 MIN: CPT

## 2023-01-13 RX ORDER — POLYETHYLENE GLYCOL 3350 AND ELECTROLYTES WITH LEMON FLAVOR 236; 22.74; 6.74; 5.86; 2.97 G/4L; G/4L; G/4L; G/4L; G/4L
236 POWDER, FOR SOLUTION ORAL
Qty: 1 | Refills: 0 | Status: ACTIVE | COMMUNITY
Start: 2023-01-13 | End: 1900-01-01

## 2023-01-13 RX ORDER — ASPIRIN 81 MG
81 TABLET, DELAYED RELEASE (ENTERIC COATED) ORAL
Refills: 0 | Status: DISCONTINUED | COMMUNITY
End: 2023-01-13

## 2023-01-13 NOTE — HISTORY OF PRESENT ILLNESS
[FreeTextEntry1] : The patient has a history of liver disease. The patient has a history of fatty liver noted on prior imaging study. The patient denies any prior exposure to hepatitis A, B or C. The patient denies any large doses of nonsteroidal anti-inflammatory drugs or acetaminophen. The patient denies sharing needles, razors, nail clippers, nail files, scissors, et cetera. The patient denies any EtOH abuse, cocaine use or intravenous drug use. The patient denies any prior blood transfusions, sexual indiscretions, tattoos or piercing. The patient admits to having prior surgery. The history of surgery is significant for a prior cholecystectomy. The patient admits to a family history of GI and liver problems. The patient's mother had a history of cirrhosis of the liver.   The patient states that he is feeling fine. The patient denies any jaundice or pruritus.  The patient denies any chronic lower back pain. The patient denies any abdominal pain.  The patient complains of abdominal gas and bloating.  The patient denies any nausea or vomiting.  The patient complains of gastroesophageal reflux disease but denies any dysphagia.  The gastroesophageal reflux disease is worse after meals and late at night and in the early morning. The gastroesophageal reflux disease is improved with proton pump inhibitors, H2 blockers and antacids.   The patient denies any atypical chest pain, shortness of breath or palpitations.  The patient denies any diaphoresis. The patient complains of constipation but denies any diarrhea. The patient denies having mucus discharge with the bowel movements.  The patient denies any bright red blood per rectum, melena or hematemesis.  The patient denies any rectal pain or rectal pruritus.  The patient complains of weight loss but denies any anorexia.  The patient admits to losing 7 pounds over the past 2 weeks. The patient attributes the weight loss to recent change in diet and Jardiance.  The patient stopped taking the Jardaiance.   He denies any fevers or chills. The patient had an upper endoscopy at the [facility] GI endoscopy suite on September 17, 2020. The upper endoscopy was performed up to the level of the second portion of the duodenum. The upper endoscopy revealed a hiatal hernia and mild diffuse bile gastritis. Biopsies were taken of the distal esophagus, antrum, body of stomach and duodenum to assess for esophagitis, gastritis and duodenitis. The pathology revealed distal esophagus with squamous mucosa with reactive changes that was negative for metaplasia or eosinophilia with no evidence of fungal infection, gastric antral and body mucosa with benign antral and oxyntic mucosa with patchy, nonspecific inflammation with mild foveolar hyperplasia, mild mucosal fibrosis and fundic gland polyps that was negative for Helicobacter pylori and benign small intestinal mucosa with intact villous architecture with no histologic evidence of celiac disease. The patient tolerated the procedure well. The CAT scan of the abdomen and pelvis with IV contrast performed on November 16, 2020 revealed a mildly prominent appendix measuring 7 mm with no wall thickening or periappendiceal edema. Also noted was constipated colon. The abdominal ultrasound performed on May 2, 2022 revealed mild hepatic steatosis. Also noted was status post cholecystectomy and a 1.2 cm simple appearing cyst in the lower pole of the left kidney. The pelvic ultrasound performed on April 29, 2022 revealed a mildly enlarged prostate with a postvoid residual of 355 cc. The blood work performed on July 15, 2022 revealed a normal alpha-fetoprotein level of <1.8 ng/mL, an elevated amylase/lipase level of 141/179 U/L, respectively, no evidence of anemia with a hemoglobin/hematocrit level of 16.1/48.7, respectively, an elevated potassium level of 5.4 mmol/L, an elevated blood glucose of 114 mg/dL, normal liver enzymes with a total bilirubin of 0.4 mg/dL, a normal alkaline phosphatase/AST/ALT/GGTP of 54/25/27/20 U/L, respectively, a reactive hepatitis A IgG antibody, a normal total cholesterol/triglyceride level of 149/92 mg/dL, respectively, a normal serum iron level of 74 mcg/dL, a normal TIBC/U IBC of 315/241 mcg/dL, respectively, a normal iron percent saturation of 23%, a normal serum IgA level of 252 mg/dL, a normal rheumatoid factor of 12 IU/mL, a normal TSH of 1.41u IU/mL and a normal ESR of 3 mm/h, a negative SANDI, a normal antimitochondrial antibody of < 1:20, a negative smooth muscle antibody of < 1:20, a negative transglutaminase IgG antibody EIA of < 1.2 U/ml, a negative transglutaminase IgA antibody EIA of <1.2 U/ml, a negative Gliadin Deamidated IgG antibody of < 5.0 units, a negative Gliadin Deamidated IgA antibody of < 5.0 units. The patient is being followed by his urologist, Dr. Ramiro Underwood regarding the urinary frequency and urinary urgency. The patient had a prior history of cholecystectomy secondary to biliary colic. The patient is to being followed by his cardiologist, Dr. Cachorro Ashley. The patient admits to having a prior upper endoscopy and colonoscopy performed by another gastroenterologist, Dr. Syed Horne. According to the patient, the upper endoscopic findings were unknown to the patient. The colonoscopic findings revealed mild diverticulosis. The patient admits to a family history of GI problems. The patient’s mother had a history of liver cirrhosis with esophageal varices at age 51. \par \par (-) smoking, (-) ETOH, (-) IVDA\par  [de-identified] : The CAT scan of the abdomen and pelvis with IV contrast performed on November 16, 2020 revealed a mildly prominent appendix measuring 7 mm with no wall thickening or periappendiceal edema. Also noted was constipated colon. \par \par  [de-identified] : The abdominal ultrasound performed on May 2, 2022 revealed mild hepatic steatosis. Also noted was status post cholecystectomy and a 1.2 cm simple appearing cyst in the lower pole of the left kidney. \par \par The pelvic ultrasound performed on April 29, 2022 revealed a mildly enlarged prostate with a postvoid residual of 355 cc. \par \par The abdominal ultrasound performed on October 13, 2020 revealed a negative abdominal ultrasound.  Also noted was status post cholecystectomy

## 2023-01-13 NOTE — ASSESSMENT
[FreeTextEntry1] : Dyspepsia: The patient complains of dyspeptic symptoms.  The patient was advised to continue to abide by an anti-gas (low FOD-MAP) diet.  The patient was previously given a pamphlet for anti-gas (low FOD-MAP).  The patient and I reviewed the anti-gas (low FOD-MAP)diet at length again. The patient is to continue on a trial of Simethicone one tablet 4 times a day p.r.n. abdominal pain and gas.\par GERD: The patient was advised to avoid late-night meals and dietary indiscretions.  The patient was advised to avoid fried and fatty foods.  The patient was advised to abide by an anti-GERD diet. The patient was given a pamphlet for anti-GERD.  The patient and I reviewed the anti-GERD diet at length. I recommend a trial of Pantoprazole 40 mg once a day x 3 months for the symptoms.\par Constipation: The patient complains of constipation. I recommend a high-fiber diet. I recommend a trial of a probiotic such as Align once a day. I recommend a trial of Metamucil once a day for fiber supplementation.The patient agreed and will followup to reassess the symptoms.  \par Fatty Liver: The patient had an imaging study suggestive of fatty infiltration of the liver. The patient denies any jaundice or pruritus. The patient denies any alcohol use. The patient denies taking large doses of nonsteroidal anti-inflammatory drugs or acetaminophen. The findings are suggestive of fatty liver. The patient and I had a long discussion regarding the risks of fatty liver and possible progression to cirrhosis. The patient was told of the possible increased risk of developing liver failure, cirrhosis, ascites, GI bleeding secondary to varices, hepatic encephalopathy, bleeding tendencies and liver cancer. The patient was told of the importance of follow-up. The patient was advised to follow up every 6 months for blood work and imaging studies. The patient agreed and will follow up. The patient was advised to lose weight and exercise. The patient was advised to abide by a Mediterranean diet. I recommend a trial of vitamin E supplementation for the fatty liver. If the liver enzymes remain elevated, the patient may require a trial of Pioglitazone for the fatty liver. I recommend avoid alcohol and hepato-toxic agents. The patient was also advised to avoid NSAIDs, Acetaminophen and any other hepatotoxic drugs. The patient was also advised not to share needles, razors, scissors, nail clippers, etc.. The patient is to continue close follow-up in our office for blood work and exams. If the liver enzymes remain elevated, the patient may require a CT guided liver biopsy to assess the liver parenchyma for possible treatment. We had a long discussion regarding the risks and benefits of the procedure. The patient was told of the risks of bleeding, perforation, infections, emergency surgery and missing lesions. The patient agreed and will follow-up to reassess the symptoms. I recommend a CBC, SMA 24, amylase, lipase, ESR, TFTs, SANDI, rheumatoid factor, celiac sprue panel, IgA, profile for hepatitis A, B, C., AFP, alpha 1 anti-trypsin antibody, ceruloplasmin level, iron, TIBC, ferritin level, AMA, anti-smooth muscle antibody and PT/INR/PTT in 6 months. \par Blood Work: I re recommend obtaining the recent blood work performed by the patient's PMD.\par Imaging Study: I recommend an imaging study to assess the symptoms. I recommend an abdominal ultrasound to assess the liver parenchyma and for liver lesions.\par I recommend followup with the urologist, Dr. Underwood regarding the symptoms.\par Prior Endoscopic Procedures: The patient had a prior upper endoscopy and colonoscopy performed by another gastroenterologist, Terrell Horne. I will try to obtain the prior upper endoscopy and colonoscopy reports. The patient is to sign another record release to obtain those records.\par History of Peptic Ulcer Disease: The patient has a prior history of peptic ulcer disease. The patient is to avoid nonsteroidal anti-inflammatory drugs and aspirin. There were no ulcers on present upper endoscopy. The patient agreed and will follow-up to reassess the symptoms. I recommend a trial of Famotidine 40 mg twice a day for 3 months for the symptoms.\par Hiatal Hernia: The patient was advised to avoid late-night meals and dietary indiscretions. The patient was advised to avoid fried and fatty foods. The patient was advised to abide by an anti-GERD diet. The patient was given a pamphlet for anti-GERD. The patient and I reviewed the anti-GERD diet at length. I recommend a trial of Famotidine 40 mg twice a day for 3 months for the symptoms.\par Gastric Polyps: The patient was found to have gastric polyps on prior upper endoscopy. The pathology revealed fundic gland polyps. The patient and I discussed the risks of fundic gland polyps. The patient was told of the possibility of increasing size and bleeding secondary to gastric polyps. The patient was advised to follow up in the office to reassess the gastric polyps.\par Colon Cancer screening: I recommend colonoscopy for colon cancer screening over the age of 45 to assess for colonic polyps. The patient was told of the risks and benefits of the procedure.  The patient was told of the risks of perforation, emergency surgery, bleeding, infections and missed lesions. The patient is to be on a clear liquid diet the entire day prior to the procedure. The patient is to complete the entire prescribed bowel prep the day prior to the procedure as directed. The patient is to have a COVID 19 PCR nasopharyngeal swab performed at the approved sites 3 days prior to the procedure.  The patient is told not to drive, drink alcohol, use recreational drugs, exercise, or work the day of the procedure.  The patient was told of the need for an escort to accompany the patient home after the procedure. The patient is aware that the procedure may be cancelled if they fail to follow the directions.  The patient agreed and will schedule for the procedure. The patient can take the antihypertensive medication with a sip of water one hour prior to the procedure. The patient is to hold the diabetic medication the day before and the morning of the procedure. The patient is to hold the blood thinner medication for 5 days prior to the procedure. The patient is to be n.p.o. after midnight and bowel prep was given.  The patient is to return for the procedure.\par Follow-up: The patient is to follow-up in the office in 6 months to reassess the symptoms. The patient was told to call the office if any further problems. \par \par

## 2023-01-24 ENCOUNTER — RX RENEWAL (OUTPATIENT)
Age: 68
End: 2023-01-24

## 2023-01-24 RX ORDER — PANTOPRAZOLE 40 MG/1
40 TABLET, DELAYED RELEASE ORAL
Qty: 90 | Refills: 0 | Status: ACTIVE | COMMUNITY
Start: 2023-01-13 | End: 1900-01-01

## 2023-02-15 ENCOUNTER — OUTPATIENT (OUTPATIENT)
Dept: OUTPATIENT SERVICES | Facility: HOSPITAL | Age: 68
LOS: 1 days | End: 2023-02-15
Payer: MEDICARE

## 2023-02-15 ENCOUNTER — APPOINTMENT (OUTPATIENT)
Dept: ULTRASOUND IMAGING | Facility: HOSPITAL | Age: 68
End: 2023-02-15
Payer: MEDICARE

## 2023-02-15 DIAGNOSIS — K76.0 FATTY (CHANGE OF) LIVER, NOT ELSEWHERE CLASSIFIED: ICD-10-CM

## 2023-02-15 PROCEDURE — 76700 US EXAM ABDOM COMPLETE: CPT

## 2023-02-15 PROCEDURE — 76700 US EXAM ABDOM COMPLETE: CPT | Mod: 26

## 2023-02-17 ENCOUNTER — NON-APPOINTMENT (OUTPATIENT)
Age: 68
End: 2023-02-17

## 2023-03-13 ENCOUNTER — TRANSCRIPTION ENCOUNTER (OUTPATIENT)
Age: 68
End: 2023-03-13

## 2023-03-16 ENCOUNTER — NON-APPOINTMENT (OUTPATIENT)
Age: 68
End: 2023-03-16

## 2023-03-16 ENCOUNTER — APPOINTMENT (OUTPATIENT)
Dept: GASTROENTEROLOGY | Facility: HOSPITAL | Age: 68
End: 2023-03-16

## 2023-06-13 ENCOUNTER — OUTPATIENT (OUTPATIENT)
Dept: OUTPATIENT SERVICES | Facility: HOSPITAL | Age: 68
LOS: 1 days | End: 2023-06-13
Payer: MEDICARE

## 2023-06-13 ENCOUNTER — TRANSCRIPTION ENCOUNTER (OUTPATIENT)
Age: 68
End: 2023-06-13

## 2023-06-13 ENCOUNTER — APPOINTMENT (OUTPATIENT)
Dept: GASTROENTEROLOGY | Facility: HOSPITAL | Age: 68
End: 2023-06-13

## 2023-06-13 VITALS
DIASTOLIC BLOOD PRESSURE: 66 MMHG | SYSTOLIC BLOOD PRESSURE: 117 MMHG | OXYGEN SATURATION: 98 % | RESPIRATION RATE: 16 BRPM | HEART RATE: 77 BPM

## 2023-06-13 VITALS
HEIGHT: 65 IN | RESPIRATION RATE: 16 BRPM | HEART RATE: 85 BPM | TEMPERATURE: 97 F | OXYGEN SATURATION: 100 % | DIASTOLIC BLOOD PRESSURE: 80 MMHG | WEIGHT: 128.97 LBS | SYSTOLIC BLOOD PRESSURE: 130 MMHG

## 2023-06-13 DIAGNOSIS — Z12.11 ENCOUNTER FOR SCREENING FOR MALIGNANT NEOPLASM OF COLON: ICD-10-CM

## 2023-06-13 DIAGNOSIS — Z98.890 OTHER SPECIFIED POSTPROCEDURAL STATES: Chronic | ICD-10-CM

## 2023-06-13 DIAGNOSIS — Z90.49 ACQUIRED ABSENCE OF OTHER SPECIFIED PARTS OF DIGESTIVE TRACT: Chronic | ICD-10-CM

## 2023-06-13 PROCEDURE — G0121 COLON CA SCRN NOT HI RSK IND: CPT

## 2023-06-13 PROCEDURE — G0121: CPT

## 2023-06-13 RX ORDER — SODIUM CHLORIDE 9 MG/ML
500 INJECTION INTRAMUSCULAR; INTRAVENOUS; SUBCUTANEOUS
Refills: 0 | Status: DISCONTINUED | OUTPATIENT
Start: 2023-06-13 | End: 2023-06-27

## 2023-06-13 NOTE — CHART NOTE - NSCHARTNOTEFT_GEN_A_CORE
The patient has a history of liver disease. The patient has a history of fatty liver noted on prior imaging study. The patient denies any prior exposure to hepatitis A, B or C. The patient denies any large doses of nonsteroidal anti-inflammatory drugs or acetaminophen. The patient denies sharing needles, razors, nail clippers, nail files, scissors, et cetera. The patient denies any EtOH abuse, cocaine use or intravenous drug use. The patient denies any prior blood transfusions, sexual indiscretions, tattoos or piercing. The patient admits to having prior surgery. The history of surgery is significant for a prior cholecystectomy. The patient admits to a family history of GI and liver problems. The patient's mother had a history of cirrhosis of the liver. The patient states that he is feeling fine. The patient denies any jaundice or pruritus. The patient denies any chronic lower back pain. The patient denies any abdominal pain. The patient complains of abdominal gas and bloating. The patient denies any nausea or vomiting. The patient complains of gastroesophageal reflux disease but denies any dysphagia. The gastroesophageal reflux disease is worse after meals and late at night and in the early morning. The gastroesophageal reflux disease is improved with proton pump inhibitors, H2 blockers and antacids. The patient denies any atypical chest pain, shortness of breath or palpitations. The patient denies any diaphoresis. The patient complains of constipation but denies any diarrhea. The patient denies having mucus discharge with the bowel movements. The patient denies any bright red blood per rectum, melena or hematemesis. The patient denies any rectal pain or rectal pruritus. The patient complains of weight loss but denies any anorexia. The patient admits to losing 7 pounds over the past 2 weeks. The patient attributes the weight loss to recent change in diet and Jardiance. The patient stopped taking the Jardaiance. He denies any fevers or chills. The patient had an upper endoscopy at the [facility] endoscopy suite on September 17, 2020. The upper endoscopy was performed up to the level of the second portion of the duodenum. The upper endoscopy revealed a hiatal hernia and mild diffuse bile gastritis. Biopsies were taken of the distal esophagus, antrum, body of stomach and duodenum to assess for esophagitis, gastritis and duodenitis. The pathology revealed distal esophagus with squamous mucosa with reactive changes that was negative for metaplasia or eosinophilia with no evidence of fungal infection, gastric antral and body mucosa with benign antral and oxyntic mucosa with patchy, nonspecific inflammation with mild foveolar hyperplasia, mild mucosal fibrosis and fundic gland polyps that was negative for Helicobacter pylori and benign small intestinal mucosa with intact villous architecture with no histologic evidence of celiac disease. The patient tolerated the procedure well. The CAT scan of the abdomen and pelvis with IV contrast performed on November 16, 2020 revealed a mildly prominent appendix measuring 7 mm with no wall thickening or periappendiceal edema. Also noted was constipated colon. The abdominal ultrasound performed on May 2, 2022 revealed mild hepatic steatosis. Also noted was status post cholecystectomy and a 1.2 cm simple appearing cyst in the lower pole of the left kidney. The pelvic ultrasound performed on April 29, 2022 revealed a mildly enlarged prostate with a postvoid residual of 355 cc. The blood work performed on July 15, 2022 revealed a normal alpha-fetoprotein level of <1.8 ng/mL, an elevated amylase/lipase level of 141/179 U/L, respectively, no evidence of anemia with a hemoglobin/hematocrit level of 16.1/48.7, respectively, an elevated potassium level of 5.4 mmol/L, an elevated blood glucose of 114 mg/dL, normal liver enzymes with a total bilirubin of 0.4 mg/dL, a normal alkaline phosphatase/AST/ALT/GGTP of 54/25/27/20 U/L, respectively, a reactive hepatitis A IgG antibody, a normal total cholesterol/triglyceride level of 149/92 mg/dL, respectively, a normal serum iron level of 74 mcg/dL, a normal TIBC/U IBC of 315/241 mcg/dL, respectively, a normal iron percent saturation of 23%, a normal serum IgA level of 252 mg/dL, a normal rheumatoid factor of 12 IU/mL, a normal TSH of 1.41u IU/mL and a normal ESR of 3 mm/h, a negative SANDI, a normal antimitochondrial antibody of < 1:20, a negative smooth muscle antibody of < 1:20, a negative transglutaminase IgG antibody EIA of < 1.2 U/ml, a negative transglutaminase IgA antibody EIA of <1.2 U/ml, a negative Gliadin Deamidated IgG antibody of < 5.0 units, a negative Gliadin Deamidated IgA antibody of < 5.0 units. The patient is being followed by his urologist, Dr. Ramiro Underwood regarding the urinary frequency and urinary urgency. The patient had a prior history of cholecystectomy secondary to biliary colic. The patient is to being followed by his cardiologist, Dr. Cachorro Ashley. The patient admits to having a prior upper endoscopy and colonoscopy performed by another gastroenterologist, Dr. Syed Horne. According to the patient, the upper endoscopic findings were unknown to the patient. The colonoscopic findings revealed mild diverticulosis. The patient admits to a family history of GI problems. The patient’s mother had a history of liver cirrhosis with esophageal varices at age 51.     (-) smoking, (-) ETOH, (-) IVDA      Gastroenterology Summary   Upper Endoscopy: The upper endoscopy performed at the [facility] endoscopy suite on September 17, 2020 revealed a hiatal hernia and mild diffuse bile gastritis. The pathology revealed distal esophagus with squamous mucosa with reactive changes that was negative for metaplasia or eosinophilia with no evidence of fungal infection, gastric antral and body mucosa with benign antral and oxyntic mucosa with patchy, nonspecific inflammation with mild foveolar hyperplasia, mild mucosal fibrosis and fundic gland polyps that was negative for Helicobacter pylori and benign small intestinal mucosa with intact villous architecture with no histologic evidence of celiac disease.     Radiology Summary   CT: The CAT scan of the abdomen and pelvis with IV contrast performed on November 16, 2020 revealed a mildly prominent appendix measuring 7 mm with no wall thickening or periappendiceal edema. Also noted was constipated colon.        Abdominal Sono: The abdominal ultrasound performed on May 2, 2022 revealed mild hepatic steatosis. Also noted was status post cholecystectomy and a 1.2 cm simple appearing cyst in the lower pole of the left kidney.     The pelvic ultrasound performed on April 29, 2022 revealed a mildly enlarged prostate with a postvoid residual of 355 cc.     The abdominal ultrasound performed on October 13, 2020 revealed a negative abdominal ultrasound. Also noted was status post cholecystectomy.      Active Problems  Abdominal pain, bilateral lower quadrant (789.03,789.04) (R10.31,R10.32)  Abdominal pain, bilateral upper quadrant (789.01,789.02) (R10.11,R10.12)  Abdominal pain, chronic, left lower quadrant (789.04,338.29) (R10.32,G89.29)  Atypical chest pain (786.59) (R07.89)  BPH with urinary obstruction (600.01,599.69) (N40.1,N13.8)  Chronic GERD (530.81) (K21.9)  Constipation, unspecified constipation type (564.00) (K59.00)  Diabetes (250.00) (E11.9)  Diverticulitis, colon (562.11) (K57.32)  Dyspepsia (536.8) (R10.13)  Dysphagia, unspecified type (787.20) (R13.10)  Fatty liver (571.8) (K76.0)  Hiatal hernia (553.3) (K44.9)  Lower urinary tract symptoms (788.99) (R39.9)  Personal history of peptic ulcer disease (V12.71) (Z87.11)  Preoperative examination (V72.84) (Z01.818)  Urinary hesitancy (788.64) (R39.11)  Weight loss, unintentional (783.21) (R63.4)    Current Meds  Aspirin 81 MG TABS  Betamethasone Dipropionate 0.05 % External Cream  Ciclopirox 8 % External Solution  Dicyclomine HCl - 10 MG Oral Capsule; TAKE 1 CAPSULE 3 TIMES DAILY  Econazole Nitrate 1 % External Cream  Famotidine 40 MG Oral Tablet; TAKE 1 TABLET BY MOUTH TWICE DAILY  Famotidine 40 MG Oral Tablet; Take 1 tablet twice daily  Janumet  MG Oral Tablet  Janumet XR  MG Oral Tablet Extended Release 24 Hour  Januvia 50 MG Oral Tablet  Ketoconazole 2 % External Cream  Losartan Potassium 25 MG Oral Tablet  metFORMIN HCl - 500 MG Oral Tablet  OneTouch Delica Plus [id number]  OneTouch Verio In Vitro Strip  Pantoprazole Sodium 40 MG Oral Tablet Delayed Release; TAKE 1 TABLET BY MOUTH  DAILY  Pantoprazole Sodium 40 MG Oral Tablet Delayed Release; TAKE 1 TABLET BY MOUTH  DAILY  Pantoprazole Sodium 40 MG Oral Tablet Delayed Release  Simethicone 180 MG Oral Capsule; TAKE 1 CAPSULE 4 TIMES DAILY AS NEEDED  Simethicone 180 MG Oral Capsule; TAKE 1 CAPSULE 4 TIMES DAILY AS NEEDED  Simvastatin 20 MG Oral Tablet  Tamsulosin HCl - 0.4 MG Oral Capsule; TAKE TWO(2) CAPSULES     AT BEDTIME  Triamcinolone Acetonide 0.1 % External Cream  Triamcinolone Acetonide 0.1 % External Ointment    Allergies  Aspirin TABS    Vitals  Vital Signs   	Recorded: 13Jan2023 09:24AM  Systolic	115, RUE, Sitting  Diastolic	76, RUE, Sitting  Height	5 ft 5 in  Weight	132 lb   BMI Calculated	21.97 kg/m2  BSA Calculated	1.66  Temperature	97.5 F, Temporal  Heart Rate	86, L PT  O2 Saturation	99, Room Air    Physical Exam  Physical Exam:  General Appearance: Well developed, well nourished, no acute distress  ENT:  nose clear, ears unremarkable  Eyes: No enteric sclera, conjunctiva clear.  Neck: Supple, without masses   Respiratory: Breath sounds equal and bilateral, no wheezing no rales or rhonchi  Cardiovascular: S1-S2 audible, no murmur, no rubs or gallops  GI: (+) BS, soft, tender in the lower abdomen, no rebound, no guarding, no masses  Liver: liver edge palpated  Musculo-skeletal: Good motor strength, good range of motion, normal appearing extremities  Skin: Normal appearing skin, no jaundice, no rashes or nodules  Neurological: without focal motor or sensory deficits Patient is moving all extremities spontaneously and to command with normal muscle strength alert and oriented X3  Psychiatric: Good affect, not depressed, not anxious           Assessment  Dyspepsia (536.8) (R10.13)  Chronic GERD (530.81) (K21.9)  Constipation, unspecified constipation type (564.00) (K59.00)  Hiatal hernia (553.3) (K44.9)  Fatty liver (571.8) (K76.0)  Colon cancer screening (V76.51) (Z12.11)  Preoperative examination (V72.84) (Z01.818)    Dyspepsia: The patient complains of dyspeptic symptoms. The patient was advised to continue to abide by an anti-gas (low FOD-MAP) diet. The patient was previously given a pamphlet for anti-gas (low FOD-MAP). The patient and I reviewed the anti-gas (low FOD-MAP)diet at length again. The patient is to continue on a trial of Simethicone one tablet 4 times a day p.r.n. abdominal pain and gas.  GERD: The patient was advised to avoid late-night meals and dietary indiscretions. The patient was advised to avoid fried and fatty foods. The patient was advised to abide by an anti-GERD diet. The patient was given a pamphlet for anti-GERD. The patient and I reviewed the anti-GERD diet at length. I recommend a trial of Pantoprazole 40 mg once a day x 3 months for the symptoms.  Constipation: The patient complains of constipation. I recommend a high-fiber diet. I recommend a trial of a probiotic such as Align once a day. I recommend a trial of Metamucil once a day for fiber supplementation.The patient agreed and will followup to reassess the symptoms.   Fatty Liver: The patient had an imaging study suggestive of fatty infiltration of the liver. The patient denies any jaundice or pruritus. The patient denies any alcohol use. The patient denies taking large doses of nonsteroidal anti-inflammatory drugs or acetaminophen. The findings are suggestive of fatty liver. The patient and I had a long discussion regarding the risks of fatty liver and possible progression to cirrhosis. The patient was told of the possible increased risk of developing liver failure, cirrhosis, ascites, GI bleeding secondary to varices, hepatic encephalopathy, bleeding tendencies and liver cancer. The patient was told of the importance of follow-up. The patient was advised to follow up every 6 months for blood work and imaging studies. The patient agreed and will follow up. The patient was advised to lose weight and exercise. The patient was advised to abide by a Mediterranean diet. I recommend a trial of vitamin E supplementation for the fatty liver. If the liver enzymes remain elevated, the patient may require a trial of Pioglitazone for the fatty liver. I recommend avoid alcohol and hepato-toxic agents. The patient was also advised to avoid NSAIDs, Acetaminophen and any other hepatotoxic drugs. The patient was also advised not to share needles, razors, scissors, nail clippers, etc.. The patient is to continue close follow-up in our office for blood work and exams. If the liver enzymes remain elevated, the patient may require a CT guided liver biopsy to assess the liver parenchyma for possible treatment. We had a long discussion regarding the risks and benefits of the procedure. The patient was told of the risks of bleeding, perforation, infections, emergency surgery and missing lesions. The patient agreed and will follow-up to reassess the symptoms. I recommend a CBC, SMA 24, amylase, lipase, ESR, TFTs, SANDI, rheumatoid factor, celiac sprue panel, IgA, profile for hepatitis A, B, C., AFP, alpha 1 anti-trypsin antibody, ceruloplasmin level, iron, TIBC, ferritin level, AMA, anti-smooth muscle antibody and PT/INR/PTT in 6 months.   Blood Work: I re recommend obtaining the recent blood work performed by the patient's PMD.  Imaging Study: I recommend an imaging study to assess the symptoms. I recommend an abdominal ultrasound to assess the liver parenchyma and for liver lesions.  I recommend followup with the urologist, Dr. Underwood regarding the symptoms.  Prior Endoscopic Procedures: The patient had a prior upper endoscopy and colonoscopy performed by another gastroenterologist, Terrell Horne. I will try to obtain the prior upper endoscopy and colonoscopy reports. The patient is to sign another record release to obtain those records.  History of Peptic Ulcer Disease: The patient has a prior history of peptic ulcer disease. The patient is to avoid nonsteroidal anti-inflammatory drugs and aspirin. There were no ulcers on present upper endoscopy. The patient agreed and will follow-up to reassess the symptoms. I recommend a trial of Famotidine 40 mg twice a day for 3 months for the symptoms.  Hiatal Hernia: The patient was advised to avoid late-night meals and dietary indiscretions. The patient was advised to avoid fried and fatty foods. The patient was advised to abide by an anti-GERD diet. The patient was given a pamphlet for anti-GERD. The patient and I reviewed the anti-GERD diet at length. I recommend a trial of Famotidine 40 mg twice a day for 3 months for the symptoms.  Gastric Polyps: The patient was found to have gastric polyps on prior upper endoscopy. The pathology revealed fundic gland polyps. The patient and I discussed the risks of fundic gland polyps. The patient was told of the possibility of increasing size and bleeding secondary to gastric polyps. The patient was advised to follow up in the office to reassess the gastric polyps.  Colon Cancer screening: I recommend colonoscopy for colon cancer screening over the age of 45 to assess for colonic polyps. The patient was told of the risks and benefits of the procedure. The patient was told of the risks of perforation, emergency surgery, bleeding, infections and missed lesions. The patient is to be on a clear liquid diet the entire day prior to the procedure. The patient is to complete the entire prescribed bowel prep the day prior to the procedure as directed. The patient is to have a COVID 19 PCR nasopharyngeal swab performed at the approved sites 3 days prior to the procedure. The patient is told not to drive, drink alcohol, use recreational drugs, exercise, or work the day of the procedure. The patient was told of the need for an escort to accompany the patient home after the procedure. The patient is aware that the procedure may be cancelled if they fail to follow the directions. The patient agreed and will schedule for the procedure. The patient can take the antihypertensive medication with a sip of water one hour prior to the procedure. The patient is to hold the diabetic medication the day before and the morning of the procedure. The patient is to hold the blood thinner medication for 5 days prior to the procedure. The patient is to be n.p.o. after midnight and bowel prep was given. The patient is to return for the procedure.  Follow-up: The patient is to follow-up in the office in 6 months to reassess the symptoms. The patient was told to call the office if any further problems.

## 2023-06-13 NOTE — ASU DISCHARGE PLAN (ADULT/PEDIATRIC) - NS MD DC FALL RISK RISK
For information on Fall & Injury Prevention, visit: [email or web address] OR  [email or web address] OR  https://www.cdc.gov/steadi/patient.html

## 2023-06-13 NOTE — ASU PATIENT PROFILE, ADULT - NSICDXPASTMEDICALHX_GEN_ALL_CORE_FT
PAST MEDICAL HISTORY:  Diabetes mellitus     Hypertension     Low-density-lipoid-type (LDL) hyperlipoproteinemia

## 2023-11-05 PROBLEM — E78.00 PURE HYPERCHOLESTEROLEMIA, UNSPECIFIED: Chronic | Status: ACTIVE | Noted: 2023-06-13

## 2023-11-05 PROBLEM — I10 ESSENTIAL (PRIMARY) HYPERTENSION: Chronic | Status: ACTIVE | Noted: 2023-06-13

## 2023-11-05 PROBLEM — E11.9 TYPE 2 DIABETES MELLITUS WITHOUT COMPLICATIONS: Chronic | Status: ACTIVE | Noted: 2023-06-13

## 2023-11-08 ENCOUNTER — APPOINTMENT (OUTPATIENT)
Dept: UROLOGY | Facility: CLINIC | Age: 68
End: 2023-11-08
Payer: MEDICARE

## 2023-11-08 VITALS
OXYGEN SATURATION: 98 % | BODY MASS INDEX: 21.99 KG/M2 | WEIGHT: 132 LBS | HEIGHT: 65 IN | HEART RATE: 83 BPM | DIASTOLIC BLOOD PRESSURE: 78 MMHG | SYSTOLIC BLOOD PRESSURE: 127 MMHG | TEMPERATURE: 97.5 F

## 2023-11-08 PROCEDURE — 99214 OFFICE O/P EST MOD 30 MIN: CPT

## 2023-11-08 RX ORDER — FINASTERIDE 5 MG/1
5 TABLET, FILM COATED ORAL
Qty: 30 | Refills: 2 | Status: ACTIVE | COMMUNITY
Start: 2023-11-08 | End: 1900-01-01

## 2023-11-08 RX ORDER — TAMSULOSIN HYDROCHLORIDE 0.4 MG/1
0.4 CAPSULE ORAL
Qty: 30 | Refills: 1 | Status: ACTIVE | COMMUNITY
Start: 2023-11-08 | End: 1900-01-01

## 2023-11-09 ENCOUNTER — TRANSCRIPTION ENCOUNTER (OUTPATIENT)
Age: 68
End: 2023-11-09

## 2023-11-13 LAB
APPEARANCE: CLEAR
BACTERIA UR CULT: ABNORMAL
BACTERIA: NEGATIVE /HPF
BILIRUBIN URINE: NEGATIVE
BLOOD URINE: NEGATIVE
CAST: 0 /LPF
COLOR: YELLOW
EPITHELIAL CELLS: 0 /HPF
GLUCOSE QUALITATIVE U: NEGATIVE MG/DL
KETONES URINE: NEGATIVE MG/DL
LEUKOCYTE ESTERASE URINE: NEGATIVE
MICROSCOPIC-UA: NORMAL
NITRITE URINE: NEGATIVE
PH URINE: 6
PROTEIN URINE: NEGATIVE MG/DL
RED BLOOD CELLS URINE: 0 /HPF
SPECIFIC GRAVITY URINE: 1.02
UROBILINOGEN URINE: 0.2 MG/DL
WHITE BLOOD CELLS URINE: 0 /HPF

## 2023-11-15 ENCOUNTER — APPOINTMENT (OUTPATIENT)
Dept: UROLOGY | Facility: CLINIC | Age: 68
End: 2023-11-15
Payer: MEDICARE

## 2023-11-15 VITALS
SYSTOLIC BLOOD PRESSURE: 127 MMHG | HEART RATE: 91 BPM | OXYGEN SATURATION: 98 % | DIASTOLIC BLOOD PRESSURE: 77 MMHG | WEIGHT: 133 LBS | TEMPERATURE: 95.7 F | BODY MASS INDEX: 22.16 KG/M2 | HEIGHT: 65 IN

## 2023-11-15 PROCEDURE — 99214 OFFICE O/P EST MOD 30 MIN: CPT

## 2023-11-16 LAB
ANION GAP SERPL CALC-SCNC: 11 MMOL/L
BUN SERPL-MCNC: 20 MG/DL
CALCIUM SERPL-MCNC: 10.1 MG/DL
CHLORIDE SERPL-SCNC: 97 MMOL/L
CO2 SERPL-SCNC: 27 MMOL/L
CREAT SERPL-MCNC: 0.9 MG/DL
EGFR: 93 ML/MIN/1.73M2
GLUCOSE SERPL-MCNC: 242 MG/DL
POTASSIUM SERPL-SCNC: 4.9 MMOL/L
PSA SERPL-MCNC: 1.27 NG/ML
SODIUM SERPL-SCNC: 136 MMOL/L

## 2023-11-29 ENCOUNTER — APPOINTMENT (OUTPATIENT)
Dept: UROLOGY | Facility: CLINIC | Age: 68
End: 2023-11-29
Payer: MEDICARE

## 2023-11-29 VITALS
HEART RATE: 89 BPM | BODY MASS INDEX: 22.16 KG/M2 | WEIGHT: 133 LBS | OXYGEN SATURATION: 98 % | TEMPERATURE: 97.6 F | HEIGHT: 65 IN | SYSTOLIC BLOOD PRESSURE: 124 MMHG | DIASTOLIC BLOOD PRESSURE: 80 MMHG

## 2023-11-29 DIAGNOSIS — R39.11 HESITANCY OF MICTURITION: ICD-10-CM

## 2023-11-29 PROCEDURE — 99213 OFFICE O/P EST LOW 20 MIN: CPT | Mod: 25

## 2023-11-29 PROCEDURE — 51736 URINE FLOW MEASUREMENT: CPT

## 2023-11-29 PROCEDURE — 52000 CYSTOURETHROSCOPY: CPT

## 2024-01-12 ENCOUNTER — TRANSCRIPTION ENCOUNTER (OUTPATIENT)
Age: 69
End: 2024-01-12

## 2024-01-16 ENCOUNTER — TRANSCRIPTION ENCOUNTER (OUTPATIENT)
Age: 69
End: 2024-01-16

## 2024-01-16 ENCOUNTER — OUTPATIENT (OUTPATIENT)
Dept: OUTPATIENT SERVICES | Facility: HOSPITAL | Age: 69
LOS: 1 days | End: 2024-01-16
Payer: MEDICARE

## 2024-01-16 VITALS
DIASTOLIC BLOOD PRESSURE: 70 MMHG | OXYGEN SATURATION: 96 % | TEMPERATURE: 98 F | HEART RATE: 87 BPM | SYSTOLIC BLOOD PRESSURE: 106 MMHG | WEIGHT: 132.06 LBS | HEIGHT: 65 IN | RESPIRATION RATE: 18 BRPM

## 2024-01-16 DIAGNOSIS — Z90.49 ACQUIRED ABSENCE OF OTHER SPECIFIED PARTS OF DIGESTIVE TRACT: Chronic | ICD-10-CM

## 2024-01-16 DIAGNOSIS — N40.1 BENIGN PROSTATIC HYPERPLASIA WITH LOWER URINARY TRACT SYMPTOMS: ICD-10-CM

## 2024-01-16 DIAGNOSIS — Z01.818 ENCOUNTER FOR OTHER PREPROCEDURAL EXAMINATION: ICD-10-CM

## 2024-01-16 DIAGNOSIS — E11.9 TYPE 2 DIABETES MELLITUS WITHOUT COMPLICATIONS: ICD-10-CM

## 2024-01-16 DIAGNOSIS — Z98.890 OTHER SPECIFIED POSTPROCEDURAL STATES: Chronic | ICD-10-CM

## 2024-01-16 DIAGNOSIS — Z91.89 OTHER SPECIFIED PERSONAL RISK FACTORS, NOT ELSEWHERE CLASSIFIED: ICD-10-CM

## 2024-01-16 LAB
ALBUMIN SERPL ELPH-MCNC: 4.1 G/DL — SIGNIFICANT CHANGE UP (ref 3.5–5)
ALP SERPL-CCNC: 60 U/L — SIGNIFICANT CHANGE UP (ref 40–120)
ALT FLD-CCNC: 31 U/L DA — SIGNIFICANT CHANGE UP (ref 10–60)
ANION GAP SERPL CALC-SCNC: 5 MMOL/L — SIGNIFICANT CHANGE UP (ref 5–17)
APPEARANCE UR: CLEAR — SIGNIFICANT CHANGE UP
APTT BLD: 36.3 SEC — HIGH (ref 24.5–35.6)
AST SERPL-CCNC: 16 U/L — SIGNIFICANT CHANGE UP (ref 10–40)
BILIRUB SERPL-MCNC: 0.4 MG/DL — SIGNIFICANT CHANGE UP (ref 0.2–1.2)
BILIRUB UR-MCNC: NEGATIVE — SIGNIFICANT CHANGE UP
BLD GP AB SCN SERPL QL: SIGNIFICANT CHANGE UP
BUN SERPL-MCNC: 20 MG/DL — HIGH (ref 7–18)
CALCIUM SERPL-MCNC: 10.4 MG/DL — SIGNIFICANT CHANGE UP (ref 8.4–10.5)
CHLORIDE SERPL-SCNC: 103 MMOL/L — SIGNIFICANT CHANGE UP (ref 96–108)
CO2 SERPL-SCNC: 29 MMOL/L — SIGNIFICANT CHANGE UP (ref 22–31)
COLOR SPEC: YELLOW — SIGNIFICANT CHANGE UP
CREAT SERPL-MCNC: 1.04 MG/DL — SIGNIFICANT CHANGE UP (ref 0.5–1.3)
DIFF PNL FLD: NEGATIVE — SIGNIFICANT CHANGE UP
EGFR: 78 ML/MIN/1.73M2 — SIGNIFICANT CHANGE UP
GLUCOSE SERPL-MCNC: 140 MG/DL — HIGH (ref 70–99)
GLUCOSE UR QL: NEGATIVE MG/DL — SIGNIFICANT CHANGE UP
HCT VFR BLD CALC: 45.1 % — SIGNIFICANT CHANGE UP (ref 39–50)
HGB BLD-MCNC: 15.1 G/DL — SIGNIFICANT CHANGE UP (ref 13–17)
INR BLD: 0.96 RATIO — SIGNIFICANT CHANGE UP (ref 0.85–1.18)
KETONES UR-MCNC: NEGATIVE MG/DL — SIGNIFICANT CHANGE UP
LEUKOCYTE ESTERASE UR-ACNC: NEGATIVE — SIGNIFICANT CHANGE UP
MCHC RBC-ENTMCNC: 28.6 PG — SIGNIFICANT CHANGE UP (ref 27–34)
MCHC RBC-ENTMCNC: 33.5 GM/DL — SIGNIFICANT CHANGE UP (ref 32–36)
MCV RBC AUTO: 85.4 FL — SIGNIFICANT CHANGE UP (ref 80–100)
NITRITE UR-MCNC: NEGATIVE — SIGNIFICANT CHANGE UP
NRBC # BLD: 0 /100 WBCS — SIGNIFICANT CHANGE UP (ref 0–0)
PH UR: 6 — SIGNIFICANT CHANGE UP (ref 5–8)
PLATELET # BLD AUTO: 250 K/UL — SIGNIFICANT CHANGE UP (ref 150–400)
POTASSIUM SERPL-MCNC: 4.8 MMOL/L — SIGNIFICANT CHANGE UP (ref 3.5–5.3)
POTASSIUM SERPL-SCNC: 4.8 MMOL/L — SIGNIFICANT CHANGE UP (ref 3.5–5.3)
PROT SERPL-MCNC: 8.5 G/DL — HIGH (ref 6–8.3)
PROT UR-MCNC: NEGATIVE MG/DL — SIGNIFICANT CHANGE UP
PROTHROM AB SERPL-ACNC: 11 SEC — SIGNIFICANT CHANGE UP (ref 9.5–13)
RBC # BLD: 5.28 M/UL — SIGNIFICANT CHANGE UP (ref 4.2–5.8)
RBC # FLD: 13.2 % — SIGNIFICANT CHANGE UP (ref 10.3–14.5)
SODIUM SERPL-SCNC: 137 MMOL/L — SIGNIFICANT CHANGE UP (ref 135–145)
SP GR SPEC: 1.01 — SIGNIFICANT CHANGE UP (ref 1–1.03)
UROBILINOGEN FLD QL: 0.2 MG/DL — SIGNIFICANT CHANGE UP (ref 0.2–1)
WBC # BLD: 8.4 K/UL — SIGNIFICANT CHANGE UP (ref 3.8–10.5)
WBC # FLD AUTO: 8.4 K/UL — SIGNIFICANT CHANGE UP (ref 3.8–10.5)

## 2024-01-16 PROCEDURE — G0463: CPT

## 2024-01-16 RX ORDER — SODIUM CHLORIDE 9 MG/ML
3 INJECTION INTRAMUSCULAR; INTRAVENOUS; SUBCUTANEOUS EVERY 8 HOURS
Refills: 0 | Status: DISCONTINUED | OUTPATIENT
Start: 2024-01-22 | End: 2024-02-05

## 2024-01-16 NOTE — H&P PST ADULT - NSICDXPASTMEDICALHX_GEN_ALL_CORE_FT
PAST MEDICAL HISTORY:  Diabetes mellitus     Enlarged prostate     History of cholecystitis     Hyperlipidemia     Hypertension     Low-density-lipoid-type (LDL) hyperlipoproteinemia

## 2024-01-16 NOTE — H&P PST ADULT - PATIENT OPTIMIZED PENDING
Pending preoperative labs, UA/UC done here today, EKG and medical optimization report completed by PCP

## 2024-01-16 NOTE — H&P PST ADULT - PROBLEM SELECTOR PLAN 1
Patient is scheduled for cystoscopy with TURP on 1/22/2024  Written and oral preoperative instructions given to patient with understanding verbalized.   Instructions given to include using 4% chlorhexidine wash as directed starting 3days before day of surgery (inclusive of day of surgery)  Maintaining NPO status post midnight day before surgery  Stopping aspirin, NSAIDs, herbs, vitamins 7days before surgery   Patient is to expect a phone call day before surgery between the hours of 430- 630pm giving arrival time for surgery day.    Preoperative labs, UA/UC done here today. Results pending

## 2024-01-16 NOTE — H&P PST ADULT - NSANTHOSAYNRD_GEN_A_CORE
Discharged No. RAEANN screening performed.  STOP BANG Legend: 0-2 = LOW Risk; 3-4 = INTERMEDIATE Risk; 5-8 = HIGH Risk

## 2024-01-16 NOTE — H&P PST ADULT - PROBLEM SELECTOR PLAN 3
Patient today with STOP bang score of 4, Intermediate risk for RAEANN   Patient denies current hx/dx of RAEANN/Sleep Study Test   Recommend maintaining perioperative RAEANN risk precautions. Patient today with STOP bang score of 4, Intermediate risk for RAENAN   Patient denies current hx/dx of RAEANN/Sleep Study Test   Recommend maintaining perioperative RAEANN risk precautions.

## 2024-01-16 NOTE — H&P PST ADULT - HISTORY OF PRESENT ILLNESS
69year old male with pmhx of hypertension, T2DM, cholecystitis, hyperlipidemia and enlarged prostate presents with c/o increased urinary frequency and nocturia x 1year. Patient is here today for presurgical testing for scheduled cystoscopy with TURP on 1/22/2024

## 2024-01-16 NOTE — H&P PST ADULT - PRIMARY CARE PROVIDER
Dr. Anastasiia Thorne, [facility] - [phone] Dr. Anastasiia Thorne, [facility] - ([phone]

## 2024-01-16 NOTE — H&P PST ADULT - PROBLEM SELECTOR PLAN 2
Current treatment regimen for diabetes - Metformin 1000mg BID and Januvia 100mg daily.  Patient instructed with understanding verbalized to HOLD Metformin and Januvia the day of surgery.   Last Hgb A1C not known, will obtain from PCP   Fingerstick STAT AM day of surgery ordered   Follow up with PCP/Endocrinologist postoperatively

## 2024-01-17 LAB
CULTURE RESULTS: SIGNIFICANT CHANGE UP
SPECIMEN SOURCE: SIGNIFICANT CHANGE UP

## 2024-01-21 ENCOUNTER — TRANSCRIPTION ENCOUNTER (OUTPATIENT)
Age: 69
End: 2024-01-21

## 2024-01-22 ENCOUNTER — TRANSCRIPTION ENCOUNTER (OUTPATIENT)
Age: 69
End: 2024-01-22

## 2024-01-22 ENCOUNTER — APPOINTMENT (OUTPATIENT)
Dept: UROLOGY | Facility: HOSPITAL | Age: 69
End: 2024-01-22

## 2024-01-22 ENCOUNTER — OUTPATIENT (OUTPATIENT)
Dept: OUTPATIENT SERVICES | Facility: HOSPITAL | Age: 69
LOS: 1 days | End: 2024-01-22
Payer: MEDICARE

## 2024-01-22 VITALS
OXYGEN SATURATION: 100 % | WEIGHT: 132.06 LBS | HEART RATE: 78 BPM | HEIGHT: 65 IN | TEMPERATURE: 98 F | SYSTOLIC BLOOD PRESSURE: 124 MMHG | DIASTOLIC BLOOD PRESSURE: 77 MMHG | RESPIRATION RATE: 16 BRPM

## 2024-01-22 VITALS
DIASTOLIC BLOOD PRESSURE: 63 MMHG | TEMPERATURE: 98 F | RESPIRATION RATE: 14 BRPM | HEART RATE: 69 BPM | OXYGEN SATURATION: 100 % | SYSTOLIC BLOOD PRESSURE: 105 MMHG

## 2024-01-22 DIAGNOSIS — Z98.890 OTHER SPECIFIED POSTPROCEDURAL STATES: Chronic | ICD-10-CM

## 2024-01-22 DIAGNOSIS — Z01.818 ENCOUNTER FOR OTHER PREPROCEDURAL EXAMINATION: ICD-10-CM

## 2024-01-22 DIAGNOSIS — N40.1 BENIGN PROSTATIC HYPERPLASIA WITH LOWER URINARY TRACT SYMPTOMS: ICD-10-CM

## 2024-01-22 DIAGNOSIS — Z90.49 ACQUIRED ABSENCE OF OTHER SPECIFIED PARTS OF DIGESTIVE TRACT: Chronic | ICD-10-CM

## 2024-01-22 LAB — BLD GP AB SCN SERPL QL: SIGNIFICANT CHANGE UP

## 2024-01-22 PROCEDURE — 36415 COLL VENOUS BLD VENIPUNCTURE: CPT

## 2024-01-22 PROCEDURE — 86901 BLOOD TYPING SEROLOGIC RH(D): CPT

## 2024-01-22 PROCEDURE — 82962 GLUCOSE BLOOD TEST: CPT

## 2024-01-22 PROCEDURE — 86900 BLOOD TYPING SEROLOGIC ABO: CPT

## 2024-01-22 PROCEDURE — 86850 RBC ANTIBODY SCREEN: CPT

## 2024-01-22 PROCEDURE — 52648 LASER SURGERY OF PROSTATE: CPT

## 2024-01-22 PROCEDURE — 88305 TISSUE EXAM BY PATHOLOGIST: CPT

## 2024-01-22 PROCEDURE — 88305 TISSUE EXAM BY PATHOLOGIST: CPT | Mod: 26

## 2024-01-22 PROCEDURE — 52601 PROSTATECTOMY (TURP): CPT

## 2024-01-22 RX ORDER — ATORVASTATIN CALCIUM 80 MG/1
1 TABLET, FILM COATED ORAL
Refills: 0 | DISCHARGE

## 2024-01-22 RX ORDER — METFORMIN HYDROCHLORIDE 850 MG/1
1 TABLET ORAL
Refills: 0 | DISCHARGE

## 2024-01-22 RX ORDER — FENTANYL CITRATE 50 UG/ML
50 INJECTION INTRAVENOUS ONCE
Refills: 0 | Status: DISCONTINUED | OUTPATIENT
Start: 2024-01-22 | End: 2024-01-22

## 2024-01-22 RX ORDER — TAMSULOSIN HYDROCHLORIDE 0.4 MG/1
1 CAPSULE ORAL
Qty: 30 | Refills: 0
Start: 2024-01-22 | End: 2024-02-20

## 2024-01-22 RX ORDER — SODIUM CHLORIDE 9 MG/ML
1000 INJECTION, SOLUTION INTRAVENOUS
Refills: 0 | Status: DISCONTINUED | OUTPATIENT
Start: 2024-01-22 | End: 2024-02-05

## 2024-01-22 RX ORDER — SITAGLIPTIN 50 MG/1
1 TABLET, FILM COATED ORAL
Refills: 0 | DISCHARGE

## 2024-01-22 RX ORDER — FENTANYL CITRATE 50 UG/ML
25 INJECTION INTRAVENOUS
Refills: 0 | Status: DISCONTINUED | OUTPATIENT
Start: 2024-01-22 | End: 2024-01-22

## 2024-01-22 RX ADMIN — SODIUM CHLORIDE 3 MILLILITER(S): 9 INJECTION INTRAMUSCULAR; INTRAVENOUS; SUBCUTANEOUS at 08:04

## 2024-01-22 NOTE — ASU DISCHARGE PLAN (ADULT/PEDIATRIC) - ASU DC SPECIAL INSTRUCTIONSFT
•	Catheter: Some patients are sent home with a alamo catheter while others go home urinating on their own. If you still have a catheter, the nurses will review instructions and care before you go home. For men, you will have a prescription for 1% lidocaine jelly to apply to the tip of your penis as needed for catheter related discomfort.  •	General: It is common to have blood in the urine after your procedure. It may be pink or even red; inform your doctor if you have a significant amount of clot in the urine or if you are unable to void at all or if your catheter stops draining. It is not uncommon to have some burning when you urinate, this will gradually improve. With a catheter in place, it is not uncommon to have occasional leakage of urine or blood around the catheter. Please call your urologist if this is excessive and/or the urine is not draining through the catheter into the bag.  •	Bathing: You may shower or bathe. If going home with Alamo, shower only until catheter is removed.  •	Diet: You may resume your regular diet and regular medication regimen.  •	Pain: You may take Tylenol (acetaminophen) 650-975mg and/or Motrin (ibuprofen) 400-600mg, available over the counter, for pain every 6 hours as needed. Do not exceed 4000 milligrams of Tylenol (acetaminophen) daily. You may alternate these medications such that you take either one every 3 hours.  •	Stool softeners: Do not allow yourself to become constipated as straining will cause bleeding. Take stool softeners (ex. Colace) or a laxative (ex. Senekot, ExLax), available over the counter, if needed.  •	Activity: No heavy lifting or strenuous exercise until you are evaluated at your post-operative appointment. Otherwise, you may return to your usual level of activity.  •	Follow-up: If you did not already schedule your post-operative appointment, please call your urologist to schedule a follow-up appointment.  •	Call your urologist if: You have any bleeding that does not stop, inability to void >8 hours, fever over 100.4 F, chills, persistent nausea/vomiting, or if your pain is not controlled on your discharge pain medications. •	Catheter: Some patients are sent home with a Du catheter while others go home urinating on their own. If you still have a catheter, the nurses will review instructions and care before you go home. For men, you will have a prescription for 1% lidocaine jelly to apply to the tip of your penis as needed for catheter related discomfort.  •	General: It is common to have blood in the urine after your procedure. It may be pink or even red; inform your doctor if you have a significant amount of clot in the urine or if you are unable to void at all or if your catheter stops draining. It is not uncommon to have some burning when you urinate, this will gradually improve. With a catheter in place, it is not uncommon to have occasional leakage of urine or blood around the catheter. Please call your urologist if this is excessive and/or the urine is not draining through the catheter into the bag.  •	Bathing: You may shower or bathe. If going home with Du, shower only until catheter is removed.  •	Diet: You may resume your regular diet and regular medication regimen.  •	Pain: You may take Tylenol (acetaminophen) 650-975mg and/or Motrin (ibuprofen) 400-600mg, available over the counter, for pain every 6 hours as needed. Do not exceed 4000 milligrams of Tylenol (acetaminophen) daily. You may alternate these medications such that you take either one every 3 hours.  •	Stool softeners: Do not allow yourself to become constipated as straining will cause bleeding. Take stool softeners (ex. Colace) or a laxative (ex. Senekot, ExLax), available over the counter, if needed.  •	Activity: No heavy lifting or strenuous exercise until you are evaluated at your post-operative appointment. Otherwise, you may return to your usual level of activity.  •	Follow-up: If you did not already schedule your post-operative appointment, please call your urologist to schedule a follow-up appointment.  •	Call your urologist if: You have any bleeding that does not stop, inability to void >8 hours, fever over 100.4 F, chills, persistent nausea/vomiting, or if your pain is not controlled on your discharge pain medications.

## 2024-01-22 NOTE — ASU DISCHARGE PLAN (ADULT/PEDIATRIC) - CARE PROVIDER_API CALL
Ramiro Underwood  Urology  [address] [place] [id number]  Phone: [phone]  Fax: [phone]  Follow Up Time: Routine   Ramiro Underwood  Urology  [address] [place] [id number]  Phone: [phone]  Fax: [phone]  Follow Up Time: 1-3 days

## 2024-01-22 NOTE — ASU DISCHARGE PLAN (ADULT/PEDIATRIC) - NS MD DC FALL RISK RISK
For information on Fall & Injury Prevention, visit: [email or web address] OR  [email or web address] OR  https://www.cdc.gov/steadi/patient.html

## 2024-01-22 NOTE — ASU PREOP CHECKLIST - ALLERGIES REVIEWED
Chief Complaint   Patient presents with    Hypertension    Blood Pressure Check      Kiley Bryan is here for BP check    BP Readings from Last 3 Encounters:   12/30/22 130/80   12/21/22 (!) 154/88   04/06/22 110/64       BP is 130/80      Future Appointments   Date Time Provider Forrest Dailey   4/27/2023  8:00 AM Mumtaz Hodgson MD fp sc [facility] done

## 2024-01-22 NOTE — ASU DISCHARGE PLAN (ADULT/PEDIATRIC) - PROVIDER TOKENS
PROVIDER:[TOKEN:[[id number]],FOLLOWUP:[Routine]] PROVIDER:[TOKEN:[[id number]],FOLLOWUP:[1-3 days]]

## 2024-01-24 ENCOUNTER — APPOINTMENT (OUTPATIENT)
Dept: UROLOGY | Facility: CLINIC | Age: 69
End: 2024-01-24
Payer: MEDICARE

## 2024-01-24 ENCOUNTER — APPOINTMENT (OUTPATIENT)
Dept: UROLOGY | Facility: CLINIC | Age: 69
End: 2024-01-24

## 2024-01-24 VITALS
WEIGHT: 133 LBS | BODY MASS INDEX: 22.16 KG/M2 | SYSTOLIC BLOOD PRESSURE: 112 MMHG | HEART RATE: 84 BPM | HEIGHT: 65 IN | TEMPERATURE: 97.3 F | OXYGEN SATURATION: 97 % | DIASTOLIC BLOOD PRESSURE: 70 MMHG

## 2024-01-24 PROBLEM — Z87.19 PERSONAL HISTORY OF OTHER DISEASES OF THE DIGESTIVE SYSTEM: Chronic | Status: ACTIVE | Noted: 2024-01-16

## 2024-01-24 PROBLEM — E78.5 HYPERLIPIDEMIA, UNSPECIFIED: Chronic | Status: ACTIVE | Noted: 2024-01-16

## 2024-01-24 PROBLEM — N40.0 BENIGN PROSTATIC HYPERPLASIA WITHOUT LOWER URINARY TRACT SYMPTOMS: Chronic | Status: ACTIVE | Noted: 2024-01-16

## 2024-01-24 PROCEDURE — 99024 POSTOP FOLLOW-UP VISIT: CPT

## 2024-01-25 ENCOUNTER — NON-APPOINTMENT (OUTPATIENT)
Age: 69
End: 2024-01-25

## 2024-02-07 ENCOUNTER — APPOINTMENT (OUTPATIENT)
Dept: UROLOGY | Facility: CLINIC | Age: 69
End: 2024-02-07
Payer: MEDICARE

## 2024-02-07 VITALS
HEART RATE: 98 BPM | DIASTOLIC BLOOD PRESSURE: 70 MMHG | BODY MASS INDEX: 22.16 KG/M2 | TEMPERATURE: 98.7 F | WEIGHT: 133 LBS | HEIGHT: 65 IN | SYSTOLIC BLOOD PRESSURE: 120 MMHG | OXYGEN SATURATION: 98 %

## 2024-02-07 PROCEDURE — 99024 POSTOP FOLLOW-UP VISIT: CPT

## 2024-02-07 NOTE — HISTORY OF PRESENT ILLNESS
[FreeTextEntry1] : s/p turp  doing well no pain  voiding well cont meds for now  pvr 52  hold finasteride call for difficulty voiding

## 2024-02-28 NOTE — H&P PST ADULT - ADMIT DATE
89 YO M with PMHx of CAD s/p CABG and GEMMA (last GEMMA 5/2022 on ASA and Brilinta), cardiogenic syncope with bifasicular block s/p Medtronic PPM (6/2022), pAFIB (not on AC), HTN, HLD, mild to moderate AS, PVD, CVA x 3 without residual deficits, myoclonic jerk on Keppra and CKD (baseline CRE 1.2-1.4s) who presented with SARS-COV-2, progressive encephalopathy and MABLE. Patient admitted to medicine and course complicated by bandemia and found with SMA calcification s/p diagnostic laparoscopy 12/24 and stent placement 12/25, and UGIB s/p EGD (1/2). Course ultimately complicated by progressive WOB and O2 demand and patient intubated and transferred to MICU (1/22). Course further complicated by acute cholecystitis and s/p Perc Lynsey with IR on (1/26), HFrEF 38, pulmonary edema, superimposed PNA, failed extubation failed and prolonged vent time and s/p trach (2/13). Patient transferred to RCU (2/16) and s/p PEG (2/20)     NEUROLOGY   # Encephalopathy   - Hx of CVA with no residual deficits per family, however per family worsening confusion at home over the past 6 months (becoming disoriented to time) but prior to admission has been more confused, talking about seeing people that are not present.  - CTH (1/31) with no evidence of acute infarct  - Continue on ASA and brilinta  - Holding Neurontin, Memantine and Oxycodone in setting of somnolence    # ICA stenosis   - CTA NECK (1/31) with moderate to severe narrowing left internal carotid at the origin. Severe narrowing right internal carotid by a tandem lesion 1.5 cm above the bifurcation with a narrowed internal carotid beyond the lesion. Extensive calcified plaque both cavernous and supraclinoid carotid arteries with narrowing but no occlusion. Mild narrowing proximal right M1 segment. Moderate narrowing both vertebral V4 segments. No perfusion abnormality at the Tmax 6 second threshold, but moderate hypoperfusion in the right MCA territory at the 4 second threshold.   - Continue on ASA and brilinta    # Myoclonic jerks   - Hx of myoclonic jerks post CVAs   - EEG (1/18-2/6) negative for seizures  - Continue on keppra and per neuro wishes to wean, however family wishes to keep current dose as home medication.     # Depression   - Continue on lexapro per home medication   - Insomnic per family and melatonin added   - Supportive care     CARDIOLOGY   # Vasoplegic vs septic shock  # Hx of HTN   - Weaned off pressors in ICU and now with mild hypertension   - Continue on lopressor as below and monitor HR     # AFIB RVR   # Bifasicular Block with Medtronic PPM   - AFIB RVR episodes and PPM interrogated (2/20) by EP with similar episodes  - Previous admission in 6/2022 with cardiogenic syncope second to bifasicular block, however now with PPM and AV myron blockers ok.   - Continue on lopressor 12.5mg BID however replaced with coreg 6.25 second to HFrEF   - AC discussed at length however with recent GIB will hold for now     # HFrEF 38 likely stress induced?   # Mild to moderate AS   - ECHO (12/2023) with EF 62 with normal LVSF and mild to moderate AS   - ECHO (2/2024) with EF 38, moderate LVSD with global LV hypokinesis, mildly reduced RVSF (TAPSE 1.3), mild to moderate MR, mild AR, and moderate AS.   - Continue on coreg 6.25 and hydral 10    - Continue on diuresis by dose (lasix 40mg IVP given 2/25)  - Given moderate AS monitor BP closely and avoid hypertension   - Add isordil if able     # CAD with CABG   - CATH (5/2022) with dLAD 70, SVG graft to OM1 with 90 in stent stenosis s/p PCI and GEMMA placement, and LIMA graft to mLAD with no disease.   - Continue on ASA and brilinta    RESPIRATORY   # Acute respiratory failure second to SARS-COV-2 vs pulm edema vs PNA  - Presented with COVID complicated by sepsis second to acute lynsey and worsening respiratory failure second to pulmonary edema requiring intubation.   - Prolonged intubation and s/p tracheostomy (2/13)   - CT CHEST 1/16 with new small bilateral pleural effusions/ atelectasis/ patchy bilateral ground-glass opacities are consistent with pulmonary edema.  - Completed ABX and COVID regimen as below   - Continue on vent and initially tolerates SBT 15/5 however now with poor tolerance second to weakness vs volume   - Continue on nebs and hold further HTS given intermittent hemoptysis  - Continue on diuresis as above  - Thick sputum and repeat SCx (2/26) growing GNR    # Mild hemoptysis likely from suction trauma   - s/p bronch (2/18) with no active bleed  - Hemoptysis improved with TXA and holding further HTS as above   - Tiny hemoptysis second to suction trauma imporving  - Careful with suctioning     HEENT   # L eye subconjunctival hemorrhage   - Continue on artifical tears and lacrilube   - Optho eval appreciated     GI  # Nutrition/ Dysphagia   - PEG placed by GI on 2/20 and tube feeds continued.   - Loose stools and banatrol continued     # UGIB   - EGD (1/2) with esophagitis and bleeding Dieulafoy's lesion s/p clips.   - Continue on PPI and carafate     # SMA calcification   - CTA demonstrating mesenteric fat stranding associated with ascending/transverse colon  - Diagnostic laparoscopy (12/24) with small bowel and visible colon viable, some inflammation of omentum in RUQ  - SMA Angiogram and stent placed (12/25).   - Continue on ASA and brilinta     # Acute Cholecystits   - CT (12/26) with distended GB with cholelithiasis and sludge   - HIDA (12/29) POSITIVE for acute cholecystitis   - Case discussed with IR at length and s/p PERC LYNSEY (1/26)   - Completed zosyn course (12/24-12/31)     / RENAL   # CKD   - CRE at baseline   - Monitor renal function and UOP     # Hypernatremia   -  Q6H added with improvement - increased to 300 Q4H (2/27)  - Monitor closely with diuresis as above     INFECTIOUS DISEASE   # COVID with superimposed PNA   - COVID POSITIVE (12/23) and completed remdesivir x 1 dose and paxlovid course.   - WOB worsened as above requiring intubation and cultures negative. Zosyn completed empirically however hypothermic (2/11) and BCx, UA, RVP and BAL negative.   - Completed additional zosyn (2/12-2/19) empirically   - Thick secretions in s/o new fever and SCX (2/26) with GNR - c/w Zosyn (2/27 - )    HEME  # AOCD   - Monitor HH   - DVT PPX with HSQ     ENDOCRINE   # DM2 A1C 9.3   - Continue on lantus 12 and ISS     SKIN/ TUBES   - Trach (2/13)   - PEG (2/20)   - PERC LYNSEY (1/26 - )     ETHICS   - FULL CODE     DISPO - vent facility and family aware. SW aware to send out to facilities and family to discuss on DISPO plan.    16-Jan-2024

## 2024-03-18 ENCOUNTER — NON-APPOINTMENT (OUTPATIENT)
Age: 69
End: 2024-03-18

## 2024-05-08 ENCOUNTER — APPOINTMENT (OUTPATIENT)
Dept: UROLOGY | Facility: CLINIC | Age: 69
End: 2024-05-08
Payer: MEDICARE

## 2024-05-08 VITALS
HEART RATE: 74 BPM | SYSTOLIC BLOOD PRESSURE: 118 MMHG | BODY MASS INDEX: 22.16 KG/M2 | HEIGHT: 65 IN | WEIGHT: 133 LBS | TEMPERATURE: 98.3 F | DIASTOLIC BLOOD PRESSURE: 78 MMHG | OXYGEN SATURATION: 97 %

## 2024-05-08 DIAGNOSIS — N13.8 BENIGN PROSTATIC HYPERPLASIA WITH LOWER URINARY TRACT SYMPMS: ICD-10-CM

## 2024-05-08 DIAGNOSIS — N40.1 BENIGN PROSTATIC HYPERPLASIA WITH LOWER URINARY TRACT SYMPMS: ICD-10-CM

## 2024-05-08 PROCEDURE — 99213 OFFICE O/P EST LOW 20 MIN: CPT

## 2024-06-21 ENCOUNTER — APPOINTMENT (OUTPATIENT)
Dept: GASTROENTEROLOGY | Facility: CLINIC | Age: 69
End: 2024-06-21
Payer: MEDICARE

## 2024-06-21 VITALS
OXYGEN SATURATION: 98 % | TEMPERATURE: 97.2 F | SYSTOLIC BLOOD PRESSURE: 112 MMHG | WEIGHT: 131 LBS | HEART RATE: 77 BPM | BODY MASS INDEX: 21.83 KG/M2 | DIASTOLIC BLOOD PRESSURE: 76 MMHG | HEIGHT: 65 IN

## 2024-06-21 DIAGNOSIS — K44.9 DIAPHRAGMATIC HERNIA W/OUT OBSTRUCTION OR GANGRENE: ICD-10-CM

## 2024-06-21 DIAGNOSIS — R07.0 PAIN IN THROAT: ICD-10-CM

## 2024-06-21 DIAGNOSIS — Z12.11 ENCOUNTER FOR SCREENING FOR MALIGNANT NEOPLASM OF COLON: ICD-10-CM

## 2024-06-21 DIAGNOSIS — K59.00 CONSTIPATION, UNSPECIFIED: ICD-10-CM

## 2024-06-21 DIAGNOSIS — Z87.11 PERSONAL HISTORY OF PEPTIC ULCER DISEASE: ICD-10-CM

## 2024-06-21 DIAGNOSIS — R10.13 EPIGASTRIC PAIN: ICD-10-CM

## 2024-06-21 DIAGNOSIS — K76.0 FATTY (CHANGE OF) LIVER, NOT ELSEWHERE CLASSIFIED: ICD-10-CM

## 2024-06-21 PROCEDURE — 99214 OFFICE O/P EST MOD 30 MIN: CPT

## 2024-06-21 RX ORDER — SIMETHICONE 180 MG
180 CAPSULE ORAL 4 TIMES DAILY
Qty: 120 | Refills: 3 | Status: ACTIVE | OUTPATIENT
Start: 2021-08-11

## 2024-06-21 RX ORDER — POLYETHYLENE GLYCOL 3350 AND ELECTROLYTES WITH LEMON FLAVOR 236; 22.74; 6.74; 5.86; 2.97 G/4L; G/4L; G/4L; G/4L; G/4L
236 POWDER, FOR SOLUTION ORAL
Qty: 1 | Refills: 0 | Status: ACTIVE | COMMUNITY
Start: 2024-06-21 | End: 1900-01-01

## 2024-06-21 RX ORDER — POLYETHYLENE GLYCOL 3350 17 G/17G
17 POWDER, FOR SOLUTION ORAL DAILY
Qty: 30 | Refills: 3 | Status: ACTIVE | COMMUNITY
Start: 2024-06-21 | End: 1900-01-01

## 2024-06-21 RX ORDER — PANTOPRAZOLE 40 MG/1
40 TABLET, DELAYED RELEASE ORAL
Qty: 30 | Refills: 0 | Status: ACTIVE | OUTPATIENT
Start: 2020-12-11

## 2024-06-21 NOTE — ASSESSMENT
Patient presents today for resting echo ordered by MD.     Echo Tech provided patient education about resting echo. IV started in left lower forearm.   IV start documented in  [employer].  Echo technician completed resting echo. Patient monitored according to Optison protocol. Data transferred to [facility] for final interpretation through [facility].     Optison medication:  Amount used 3ml Optison mixed with 6ml Saline - [id number].  6ml Wasted.   After completion of resting echo, IV removed.    Patient education provided about cardiology interpretation and primary provider will be notified of results.    Brinda Funez RDCS   [FreeTextEntry1] : Dyspepsia: The patient complains of dyspeptic symptoms.  The patient was advised to continue to abide by an anti-gas (low FOD-MAP) diet.  The patient was previously given a pamphlet for anti-gas (low FOD-MAP).  The patient and I reviewed the anti-gas (low FOD-MAP)diet at length again. The patient is to continue on a trial of Simethicone one tablet 4 times a day p.r.n. abdominal pain and gas. GERD: The patient was advised to avoid late-night meals and dietary indiscretions.  The patient was advised to avoid fried and fatty foods.  The patient was advised to abide by an anti-GERD diet. The patient was given a pamphlet for anti-GERD.  The patient and I reviewed the anti-GERD diet at length. I recommend a trial of Pantoprazole 40 mg once a day x 3 months for the symptoms. Constipation: The patient complains of constipation. I recommend a high-fiber diet. I recommend a trial of a probiotic such as Align once a day. I recommend a trial of Metamucil once a day for fiber supplementation. I recommend a trial of Miralax 1 packet once a day for the constipation.  The patient agreed and will followup to reassess the symptoms.   Diverticulosis: I recommend a high-fiber diet and avoid seeds. The patient is to consider a trial of Metamucil once a day for fiber supplementation. The patient is to also consider a trial of a probiotic such as Align once a day. The patient and I discussed the potential risk of diverticulitis and diverticular bleeding secondary to diverticular disease. The patient is aware of the importance of follow-up if these complications arise. Internal Hemorrhoids: The patient is to consider  a trial of Anusol H. C. suppositories one per rectum nightly and Anusol HC2 .5% cream apply to affected area twice a day p.r.n. hemorrhoidal bleeding or pain. I also recommend a trial of Calmoseptine cream apply to affected area twice a day for hemorrhoidal discomfort.  I recommend Tucks pads for the hemorrhoids.  I recommend Sitz baths twice a day for the hemorrhoids.  I recommend avoid wearing tight underwear and use boxers.  I recommend avoid touching the perineal area.   The patient agreed to followup.  Poor Prep Colonoscopy: The patient had a prior poor prep colonoscopy. I recommend a repeat colonoscopy to reassess for colonic polyps secondary to the prior poor prep.  The patient was told of the risks and benefits of the procedure.  The patient was told of the risks of perforation, emergency surgery, bleeding, infections and missed lesions.  The patient is to be on a clear liquid diet the entire day prior to the procedure. The patient is to complete the entire prescribed bowel prep the day prior to the procedure as directed.   The patient is told not to drive, drink alcohol, use recreational drugs, exercise, or work the day of the procedure.  The patient was told of the need for an escort to accompany the patient home after the procedure. The patient is aware that the procedure may be cancelled if they fail to follow the directions.  The patient agreed and will schedule for the procedure.  The patient is to hold the diabetic medication the day before and the morning of the procedure.  The patient is to be n.p.o. after midnight and bowel prep was given.  The patient is to return for the procedure. Colon Cancer screening: I recommend colonoscopy for colon cancer screening over the age of 45 to assess for colonic polyps. The patient was told of the risks and benefits of the procedure. The patient was told of the risks of perforation, emergency surgery, bleeding, infections and missed lesions. The patient is to be on a clear liquid diet the entire day prior to the procedure. The patient is to complete the entire prescribed bowel prep the day prior to the procedure as directed. The patient is told not to drive, drink alcohol, use recreational drugs, exercise, or work the day of the procedure. The patient was told of the need for an escort to accompany the patient home after the procedure. The patient is aware that the procedure may be cancelled if they fail to follow the directions. The patient agreed and will schedule for the procedure.  The patient is to hold the diabetic medication the day before and the morning of the procedure.  The patient is to be n.p.o. after midnight and bowel prep was given. The patient is to return for the procedure. Fatty Liver: The patient had an imaging study suggestive of fatty infiltration of the liver. The patient denies any jaundice or pruritus. The patient denies any alcohol use. The patient denies taking large doses of nonsteroidal anti-inflammatory drugs or acetaminophen. The findings are suggestive of fatty liver. The patient and I had a long discussion regarding the risks of fatty liver and possible progression to cirrhosis. The patient was told of the possible increased risk of developing liver failure, cirrhosis, ascites, GI bleeding secondary to varices, hepatic encephalopathy, bleeding tendencies and liver cancer. The patient was told of the importance of follow-up. The patient was advised to follow up every 6 months for blood work and imaging studies. The patient agreed and will follow up. The patient was advised to lose weight and exercise. The patient was advised to abide by a Mediterranean diet. I recommend a trial of vitamin E supplementation for the fatty liver. If the liver enzymes remain elevated, the patient may require a trial of Pioglitazone for the fatty liver. I recommend avoid alcohol and hepato-toxic agents. The patient was also advised to avoid NSAIDs, Acetaminophen and any other hepatotoxic drugs. The patient was also advised not to share needles, razors, scissors, nail clippers, etc.. The patient is to continue close follow-up in our office for blood work and exams. If the liver enzymes remain elevated, the patient may require a CT guided liver biopsy to assess the liver parenchyma for possible treatment. We had a long discussion regarding the risks and benefits of the procedure. The patient was told of the risks of bleeding, perforation, infections, emergency surgery and missing lesions. The patient agreed and will follow-up to reassess the symptoms. I recommend a CBC, SMA 24, amylase, lipase, ESR, TFTs, SANDI, rheumatoid factor, celiac sprue panel, IgA, profile for hepatitis A, B, C., AFP, alpha 1 anti-trypsin antibody, ceruloplasmin level, iron, TIBC, ferritin level, AMA, anti-smooth muscle antibody and PT/INR/PTT in 6 months. Blood Work: I re recommend obtaining the recent blood work performed by the patient's PMD. The abdominal ultrasound performed on February 15, 2023 revealed no hepatic lesions. Also noted was status post cholecystectomy. I recommend followup with the urologist, Dr. Underwood regarding the symptoms. Prior Endoscopic Procedures: The patient had a prior upper endoscopy and colonoscopy performed by another gastroenterologist, Terrell Horne. I will try to obtain the prior upper endoscopy and colonoscopy reports. The patient is to sign another record release to obtain those records. History of Peptic Ulcer Disease: The patient has a prior history of peptic ulcer disease. The patient is to avoid nonsteroidal anti-inflammatory drugs and aspirin. There were no ulcers on present upper endoscopy. The patient agreed and will follow-up to reassess the symptoms. I recommend a trial of Famotidine 40 mg twice a day for 3 months for the symptoms. Hiatal Hernia: The patient was advised to avoid late-night meals and dietary indiscretions. The patient was advised to avoid fried and fatty foods. The patient was advised to abide by an anti-GERD diet. The patient was given a pamphlet for anti-GERD. The patient and I reviewed the anti-GERD diet at length. I recommend a trial of Pantoprazole 40 mg once a day for 3 months for the symptoms. Gastric Polyps: The patient was found to have gastric polyps on prior upper endoscopy. The pathology revealed fundic gland polyps. The patient and I discussed the risks of fundic gland polyps. The patient was told of the possibility of increasing size and bleeding secondary to gastric polyps. The patient was advised to follow up in the office to reassess the gastric polyps. Follow-up: The patient is to follow-up in the office in 1 month to reassess the symptoms. The patient was told to call the office if any further problems.

## 2024-06-21 NOTE — HISTORY OF PRESENT ILLNESS
[FreeTextEntry1] : The colonoscopy to the cecum performed at the [facility] GI endoscopy suite on June 13, 2023 revealed moderate left sided diverticulosis, a poor prep colonoscopy and small internal hemorrhoids.  The study was limited secondary to retained liquid and solid stool scattered throughout the cecum and ascending colon but no gross lesions were noted.  Unable to comment on small colonic polyps or masses secondary to the poor prep.  There were no polyps, masses, AVMs or colitis noted.   [de-identified] : The CAT scan of the abdomen and pelvis with IV contrast performed on November 16, 2020 revealed a mildly prominent appendix measuring 7 mm with no wall thickening or periappendiceal edema. Also noted was constipated colon. \par  \par   [de-identified] : The abdominal ultrasound performed on February 15, 2023 revealed no hepatic lesions. Also noted was status post cholecystectomy.  The abdominal ultrasound performed on May 2, 2022 revealed mild hepatic steatosis. Also noted was status post cholecystectomy and a 1.2 cm simple appearing cyst in the lower pole of the left kidney.   The pelvic ultrasound performed on April 29, 2022 revealed a mildly enlarged prostate with a postvoid residual of 355 cc.   The abdominal ultrasound performed on October 13, 2020 revealed a negative abdominal ultrasound.  Also noted was status post cholecystectomy

## 2025-04-02 ENCOUNTER — APPOINTMENT (OUTPATIENT)
Dept: GASTROENTEROLOGY | Facility: CLINIC | Age: 70
End: 2025-04-02

## 2025-04-02 VITALS
BODY MASS INDEX: 19.83 KG/M2 | HEART RATE: 88 BPM | TEMPERATURE: 97.2 F | SYSTOLIC BLOOD PRESSURE: 109 MMHG | WEIGHT: 119 LBS | DIASTOLIC BLOOD PRESSURE: 74 MMHG | OXYGEN SATURATION: 98 % | HEIGHT: 65 IN

## 2025-04-02 DIAGNOSIS — K76.0 FATTY (CHANGE OF) LIVER, NOT ELSEWHERE CLASSIFIED: ICD-10-CM

## 2025-04-02 DIAGNOSIS — R63.4 ABNORMAL WEIGHT LOSS: ICD-10-CM

## 2025-04-02 DIAGNOSIS — Z87.11 PERSONAL HISTORY OF PEPTIC ULCER DISEASE: ICD-10-CM

## 2025-04-02 DIAGNOSIS — R13.10 DYSPHAGIA, UNSPECIFIED: ICD-10-CM

## 2025-04-02 DIAGNOSIS — Z12.11 ENCOUNTER FOR SCREENING FOR MALIGNANT NEOPLASM OF COLON: ICD-10-CM

## 2025-04-02 DIAGNOSIS — K59.00 CONSTIPATION, UNSPECIFIED: ICD-10-CM

## 2025-04-02 DIAGNOSIS — K21.9 GASTRO-ESOPHAGEAL REFLUX DISEASE W/OUT ESOPHAGITIS: ICD-10-CM

## 2025-04-02 DIAGNOSIS — K44.9 DIAPHRAGMATIC HERNIA W/OUT OBSTRUCTION OR GANGRENE: ICD-10-CM

## 2025-04-02 PROCEDURE — 99214 OFFICE O/P EST MOD 30 MIN: CPT

## 2025-04-02 RX ORDER — SODIUM SULFATE, POTASSIUM SULFATE AND MAGNESIUM SULFATE 1.6; 3.13; 17.5 G/177ML; G/177ML; G/177ML
17.5-3.13-1.6 SOLUTION ORAL
Qty: 1 | Refills: 0 | Status: ACTIVE | COMMUNITY
Start: 2025-04-02 | End: 1900-01-01

## 2025-07-29 ENCOUNTER — RX RENEWAL (OUTPATIENT)
Age: 70
End: 2025-07-29

## 2025-08-05 ENCOUNTER — APPOINTMENT (OUTPATIENT)
Dept: GASTROENTEROLOGY | Facility: HOSPITAL | Age: 70
End: 2025-08-05

## 2025-09-05 ENCOUNTER — APPOINTMENT (OUTPATIENT)
Dept: GASTROENTEROLOGY | Facility: CLINIC | Age: 70
End: 2025-09-05

## (undated) DEVICE — FOLEY CATH 3-WAY 22FR 30CC LATEX LUBRICATH

## (undated) DEVICE — ELCTR PLASMA ROLLER 24FR 12-30 DEG

## (undated) DEVICE — Device

## (undated) DEVICE — FORCEP BIOPSY 2.5MM DISP

## (undated) DEVICE — CATH ELCTR GLIDE PRB 7FR

## (undated) DEVICE — SOLIDIFIER 1200CC

## (undated) DEVICE — KIT ENDO PROCEDURE CUST W/VLV

## (undated) DEVICE — SOL IRR POUR NS 0.9% 1500ML

## (undated) DEVICE — CLAMP BX HOT RAD JAW 3

## (undated) DEVICE — SYR LUER LOK 50CC

## (undated) DEVICE — VENODYNE/SCD SLEEVE CALF MEDIUM

## (undated) DEVICE — WARMING BLANKET UPPER ADULT

## (undated) DEVICE — ELCTR PLASMA BUTTON OVAL 24FR 12-30 DEG

## (undated) DEVICE — TUBING CANNULA SALTER LABS NASAL ADULT 7FT

## (undated) DEVICE — SYR LUER LOK 20CC

## (undated) DEVICE — FORMALIN CONTAINER MED

## (undated) DEVICE — SOL INJ NS 0.9% 500ML 1-PORT

## (undated) DEVICE — DRAINAGE BAG URINARY 2L

## (undated) DEVICE — SENSOR O2 FINGER ADULT

## (undated) DEVICE — PREP BETADINE SPONGE STICKS

## (undated) DEVICE — TUBING IV SET GRAVITY 3Y 100" MACRO

## (undated) DEVICE — ELCTR PLASMA LOOP MEDIUM ANGLED 24FR 12-30 DEG

## (undated) DEVICE — SNARE LOOP POLY DISP 30MM LOOP

## (undated) DEVICE — BLADDER EVACUATOR ELLIK DISP STERILE

## (undated) DEVICE — ADAPTER ENDO CHNL SINGLE USE

## (undated) DEVICE — GOWN XXL

## (undated) DEVICE — TUBING ENDO EXT OLYMPUS 160 24HR USE GI

## (undated) DEVICE — NDL INJ SCLERO INTERJECT 23G

## (undated) DEVICE — TUBING MEDI-VAC W MAXIGRIP CONNECTORS 1/4"X6'

## (undated) DEVICE — PACK CYSTO

## (undated) DEVICE — LUBRICATING JELLY ONESHOT 1.25OZ

## (undated) DEVICE — RETRIEVER ROTH NET PLATINUM-UNIVERSAL

## (undated) DEVICE — DRSG CURITY GAUZE SPONGE 4 X 4" 12-PLY

## (undated) DEVICE — SYR CATH TIP 2 OZ

## (undated) DEVICE — BASIN SET SINGLE

## (undated) DEVICE — STERIS DEFENDO 3-PIECE KIT (AIR/WATER, SUCTION & BIOPSY VALVES)